# Patient Record
Sex: FEMALE | Race: WHITE | NOT HISPANIC OR LATINO | Employment: OTHER | URBAN - METROPOLITAN AREA
[De-identification: names, ages, dates, MRNs, and addresses within clinical notes are randomized per-mention and may not be internally consistent; named-entity substitution may affect disease eponyms.]

---

## 2017-04-28 ENCOUNTER — TRANSCRIBE ORDERS (OUTPATIENT)
Dept: MAMMOGRAPHY | Facility: HOSPITAL | Age: 77
End: 2017-04-28

## 2017-04-28 DIAGNOSIS — Z12.31 VISIT FOR SCREENING MAMMOGRAM: Primary | ICD-10-CM

## 2017-05-16 ENCOUNTER — APPOINTMENT (OUTPATIENT)
Dept: MAMMOGRAPHY | Facility: HOSPITAL | Age: 77
End: 2017-05-16
Attending: FAMILY MEDICINE

## 2017-05-17 ENCOUNTER — HOSPITAL ENCOUNTER (OUTPATIENT)
Dept: MAMMOGRAPHY | Facility: HOSPITAL | Age: 77
Discharge: HOME | End: 2017-05-17
Attending: FAMILY MEDICINE | Admitting: FAMILY MEDICINE

## 2017-05-17 DIAGNOSIS — Z12.31 VISIT FOR SCREENING MAMMOGRAM: ICD-10-CM

## 2017-05-17 PROCEDURE — 77063 BREAST TOMOSYNTHESIS BI: CPT

## 2017-05-17 PROCEDURE — 77063 BREAST TOMOSYNTHESIS BI: CPT | Performed by: RADIOLOGY

## 2017-05-17 PROCEDURE — G0202 SCR MAMMO BI INCL CAD: HCPCS | Performed by: RADIOLOGY

## 2017-05-17 PROCEDURE — G0202 SCR MAMMO BI INCL CAD: HCPCS

## 2018-03-12 ENCOUNTER — TRANSCRIBE ORDERS (OUTPATIENT)
Dept: ADMINISTRATIVE | Facility: HOSPITAL | Age: 78
End: 2018-03-12

## 2018-03-12 DIAGNOSIS — Z12.31 VISIT FOR SCREENING MAMMOGRAM: Primary | ICD-10-CM

## 2018-05-18 ENCOUNTER — HOSPITAL ENCOUNTER (OUTPATIENT)
Dept: MAMMOGRAPHY | Facility: HOSPITAL | Age: 78
Discharge: HOME | End: 2018-05-18
Attending: FAMILY MEDICINE | Admitting: FAMILY MEDICINE

## 2018-05-18 DIAGNOSIS — Z12.31 VISIT FOR SCREENING MAMMOGRAM: ICD-10-CM

## 2018-05-18 PROCEDURE — 77063 BREAST TOMOSYNTHESIS BI: CPT

## 2018-05-18 PROCEDURE — 77067 SCR MAMMO BI INCL CAD: CPT | Performed by: RADIOLOGY

## 2018-05-18 PROCEDURE — 77063 BREAST TOMOSYNTHESIS BI: CPT | Performed by: RADIOLOGY

## 2018-05-18 PROCEDURE — 77067 SCR MAMMO BI INCL CAD: CPT

## 2019-03-18 ENCOUNTER — TRANSCRIBE ORDERS (OUTPATIENT)
Dept: ADMINISTRATIVE | Facility: HOSPITAL | Age: 79
End: 2019-03-18

## 2019-03-18 DIAGNOSIS — Z12.31 VISIT FOR SCREENING MAMMOGRAM: Primary | ICD-10-CM

## 2019-05-22 ENCOUNTER — HOSPITAL ENCOUNTER (OUTPATIENT)
Dept: MAMMOGRAPHY | Facility: HOSPITAL | Age: 79
Discharge: HOME | End: 2019-05-22
Admitting: FAMILY MEDICINE

## 2019-05-22 DIAGNOSIS — Z12.31 VISIT FOR SCREENING MAMMOGRAM: ICD-10-CM

## 2019-05-22 PROCEDURE — 77067 SCR MAMMO BI INCL CAD: CPT

## 2019-05-22 PROCEDURE — 77063 BREAST TOMOSYNTHESIS BI: CPT | Performed by: RADIOLOGY

## 2019-05-22 PROCEDURE — 77063 BREAST TOMOSYNTHESIS BI: CPT

## 2019-05-22 PROCEDURE — 77067 SCR MAMMO BI INCL CAD: CPT | Performed by: RADIOLOGY

## 2020-05-14 ENCOUNTER — TRANSCRIBE ORDERS (OUTPATIENT)
Dept: ADMINISTRATIVE | Facility: HOSPITAL | Age: 80
End: 2020-05-14

## 2020-05-14 DIAGNOSIS — Z12.31 VISIT FOR SCREENING MAMMOGRAM: Primary | ICD-10-CM

## 2020-06-12 ENCOUNTER — HOSPITAL ENCOUNTER (EMERGENCY)
Facility: HOSPITAL | Age: 80
Discharge: HOME | End: 2020-06-12
Attending: EMERGENCY MEDICINE | Admitting: EMERGENCY MEDICINE

## 2020-06-12 ENCOUNTER — APPOINTMENT (OUTPATIENT)
Dept: GENERAL RADIOLOGY | Facility: HOSPITAL | Age: 80
End: 2020-06-12

## 2020-06-12 VITALS
TEMPERATURE: 99.3 F | DIASTOLIC BLOOD PRESSURE: 96 MMHG | WEIGHT: 150 LBS | BODY MASS INDEX: 24.11 KG/M2 | HEIGHT: 66 IN | HEART RATE: 74 BPM | OXYGEN SATURATION: 98 % | SYSTOLIC BLOOD PRESSURE: 128 MMHG | RESPIRATION RATE: 16 BRPM

## 2020-06-12 DIAGNOSIS — R07.89 CHEST WALL PAIN: ICD-10-CM
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
## 2020-06-12 DIAGNOSIS — S52.125A CLOSED NONDISPLACED FRACTURE OF HEAD OF LEFT RADIUS, INITIAL ENCOUNTER: Primary | ICD-10-CM

## 2020-06-12 PROCEDURE — 99283 EMERGENCY DEPT VISIT LOW MDM: CPT

## 2020-06-12 PROCEDURE — 73060 X-RAY EXAM OF HUMERUS: CPT

## 2020-06-12 PROCEDURE — 71111 X-RAY EXAM RIBS/CHEST4/> VWS: CPT

## 2020-06-12 PROCEDURE — 73090 X-RAY EXAM OF FOREARM: CPT

## 2020-06-12 RX ORDER — HYDROCODONE BITARTRATE AND ACETAMINOPHEN 7.5; 325 MG/1; MG/1
1 TABLET ORAL ONCE
Status: COMPLETED | OUTPATIENT
Start: 2020-06-12 | End: 2020-06-12

## 2020-06-12 RX ORDER — HYDROCODONE BITARTRATE AND ACETAMINOPHEN 5; 325 MG/1; MG/1
1-2 TABLET ORAL EVERY 4 HOURS PRN
Qty: 12 TABLET | Refills: 0 | Status: SHIPPED | OUTPATIENT
Start: 2020-06-12 | End: 2022-12-26 | Stop reason: HOSPADM

## 2020-06-12 RX ADMIN — HYDROCODONE BITARTRATE AND ACETAMINOPHEN 1 TABLET: 7.5; 325 TABLET ORAL at 17:20

## 2020-06-12 NOTE — ED PROVIDER NOTES
Subjective   Yarelis Barragan is a 80 y.o. female who presents to the ED with complaints of fall. She states that she was walking when she tripped on a seam in the sidewalk. The fall occurred at 1030 this morning. The patient tried to catch her fall by out-stretching her arms. She complains of pain throughout the middle portion of her left arm, left elbow region, and left rib cage. The pain in her left arm is described as a shooting pain. Her chest hurts when trying to sit up. Her  gave her some Naproxen for her pain at 1330. She has fractured her left arm before. She is able to move her fingers, with pain. Her former orthopedist has retired. She denies any neck pain. Her surgical history consists of a bilateral oophorectomy and hysterectomy. There are no other acute complaints at this time.      History provided by:  Medical records and patient  Fall   Mechanism of injury: fall    Injury location:  Shoulder/arm  Shoulder/arm injury location:  L arm  Incident location:  Outdoors  Time since incident:  8 hours  Arrived directly from scene: no    Fall:     Fall occurred:  Walking    Impact surface:  [place]  Prior to arrival data:     Loss of consciousness: no      Amnesic to event: no    Associated symptoms: chest pain (Left rib cage.)    Associated symptoms: no neck pain        Review of Systems   Cardiovascular: Positive for chest pain (Left rib cage.).   Musculoskeletal: Positive for myalgias (Left arm.). Negative for neck pain.   Neurological: Negative for syncope.   All other systems reviewed and are negative.      History reviewed. No pertinent past medical history.    No Known Allergies    Past Surgical History:   Procedure Laterality Date   • HYSTERECTOMY      AGE 52   • OOPHORECTOMY Bilateral     AGE 52       Family History   Problem Relation Age of Onset   • Breast cancer Paternal Grandmother 50   • Ovarian cancer Neg Hx        Social History     Socioeconomic History   • Marital status:       Spouse name: Not on file   • Number of children: Not on file   • Years of education: Not on file   • Highest education level: Not on file   Tobacco Use   • Smoking status: Never Smoker   Substance and Sexual Activity   • Alcohol use: Not Currently   • Drug use: Never   • Sexual activity: Defer         Objective   Physical Exam   Constitutional: She is oriented to person, place, and time. She appears well-developed and well-nourished.   HENT:   Head: Normocephalic and atraumatic.   Nose: Nose normal.   Eyes: Conjunctivae are normal. No scleral icterus.   Neck: Normal range of motion. Neck supple.   Cardiovascular: Normal rate, regular rhythm and normal heart sounds.   No murmur heard.  Pulses:       Radial pulses are 2+ on the right side, and 2+ on the left side.   2+ radial pulses.   Pulmonary/Chest: Effort normal and breath sounds normal. No respiratory distress. She exhibits tenderness. She exhibits no crepitus.   Left anterior inferior and lateral inferior chest wall tender to palpation, but without crepitus or contusion.  Clear lung sounds in all fields.   Abdominal: Soft. She exhibits no distension. There is no tenderness.   Abdomen non-tender.   Musculoskeletal: Normal range of motion. She exhibits no deformity.   Patient cannot extend or flex left elbow secondary to pain. Pronation an supination of left hand causes pain throughout the entire upper extremity. No shoulder involvement.   Neurological: She is alert and oriented to person, place, and time.   Skin: Skin is warm and dry.   No skin breaks.   Psychiatric: She has a normal mood and affect. Her behavior is normal.   Nursing note and vitals reviewed.      Procedures         ED Course  ED Course as of Jun 13 1241   Fri Jun 12, 2020   170 BARRIE query complete. Treatment plan to include limited course of prescribed controlled substance. Risks including addiction, benefits, and alternatives presented to patient.     #[id number]    []   1228 Radiographs of  "the left upper extremity demonstrate a small cortical irregularity of the radial head per my interpretation.    [MS]      ED Course User Index  [KP] MariaaRhea  [MS] Lamberto Rodriguez MD     No results found for this or any previous visit (from the past 24 hour(s)).  Note: In addition to lab results from this visit, the labs listed above may include labs taken at another facility or during a different encounter within the last 24 hours. Please correlate lab times with ED admission and discharge times for further clarification of the services performed during this visit.    XR Humerus Left   Preliminary Result   Nondisplaced radial head fracture. The remainder of the   humerus and forearm are grossly unremarkable. Degenerative changes seen   within the wrist.       DICTATED:   06/12/2020   EDITED/ls :   06/12/2020               XR Ribs Bilateral 4+ View With PA Chest   Preliminary Result   No acute cardiopulmonary disease, and no definite right or   left-sided rib fracture identified.       DICTATED:   06/12/2020   EDITED/ls :   06/12/2020               XR Forearm 2 View Left   Preliminary Result   Nondisplaced radial head fracture. The remainder of the   humerus and forearm are grossly unremarkable. Degenerative changes seen   within the wrist.       DICTATED:   06/12/2020   EDITED/ls :   06/12/2020                 Vitals:    06/12/20 1405 06/12/20 1416 06/12/20 1725   BP:  145/85 128/96   Pulse:  74    Resp: 20 16    Temp: 99.3 °F (37.4 °C)     SpO2:  96% 98%   Weight: 68 kg (150 lb)     Height: 167.6 cm (66\")       Medications   HYDROcodone-acetaminophen (NORCO) 7.5-325 MG per tablet 1 tablet (1 tablet Oral Given 6/12/20 1720)     ECG/EMG Results (last 24 hours)     ** No results found for the last 24 hours. **        No orders to display         COVID-19 RISK SCREEN     1. Has the patient had close contact without PPE with a lab confirmed COVID-19 (+) person or a person under investigation (PUI) for COVID-19 " infection?  -- No    2. Has the patient had respiratory symptoms, worsened/new cough and/or SOA, unexplained fever, or sudden loss of smell and/or taste in the past 7 days? --  No    3. Does the patient have baseline higher exposure risk such as working in healthcare field or currently residing in healthcare facility?  --  No                                            MDM    Final diagnoses:   Closed nondisplaced fracture of head of left radius, initial encounter   Chest wall pain   Fall, initial encounter       Documentation assistance provided by nuzhat Leroy.  Information recorded by the scribe was done at my direction and has been verified and validated by me.     Rhea Leroy  06/12/20 [id number]       Lamberto Rodriguez MD  06/13/20 [id number]

## 2020-06-12 NOTE — DISCHARGE INSTRUCTIONS
Take narcotics as prescribed. Be very careful as narcotics can give some people some imbalance. Take Miralax as needed for constipation, which can be narcotic-induced. Keep sling on throughout the day and at night, if this helps with the discomfort. Follow-up with Dr. Osiel Hood at next available appointment. Return to ED if worse.

## 2020-08-05 ENCOUNTER — HOSPITAL ENCOUNTER (OUTPATIENT)
Dept: MAMMOGRAPHY | Facility: HOSPITAL | Age: 80
Discharge: HOME | End: 2020-08-05
Admitting: FAMILY MEDICINE

## 2020-08-05 DIAGNOSIS — Z12.31 VISIT FOR SCREENING MAMMOGRAM: ICD-10-CM

## 2020-08-05 PROCEDURE — 77067 SCR MAMMO BI INCL CAD: CPT

## 2020-08-05 PROCEDURE — 77063 BREAST TOMOSYNTHESIS BI: CPT

## 2020-08-05 PROCEDURE — 77063 BREAST TOMOSYNTHESIS BI: CPT | Performed by: RADIOLOGY

## 2020-08-05 PROCEDURE — 77067 SCR MAMMO BI INCL CAD: CPT | Performed by: RADIOLOGY

## 2021-01-15 ENCOUNTER — IMMUNIZATION (OUTPATIENT)
Dept: VACCINE CLINIC | Facility: HOSPITAL | Age: 81
End: 2021-01-15

## 2021-01-15 PROCEDURE — 0002A: CPT | Performed by: FAMILY MEDICINE

## 2021-01-15 PROCEDURE — 0001A: CPT | Performed by: FAMILY MEDICINE

## 2021-01-15 PROCEDURE — 91300 HC SARSCOV02 VAC 30MCG/0.3ML IM: CPT | Performed by: FAMILY MEDICINE

## 2021-02-05 ENCOUNTER — IMMUNIZATION (OUTPATIENT)
Dept: VACCINE CLINIC | Facility: HOSPITAL | Age: 81
End: 2021-02-05

## 2021-02-05 PROCEDURE — 0002A: CPT | Performed by: INTERNAL MEDICINE

## 2021-02-05 PROCEDURE — 91300 HC SARSCOV02 VAC 30MCG/0.3ML IM: CPT | Performed by: INTERNAL MEDICINE

## 2021-09-13 ENCOUNTER — TRANSCRIBE ORDERS (OUTPATIENT)
Dept: ADMINISTRATIVE | Facility: HOSPITAL | Age: 81
End: 2021-09-13

## 2021-09-13 DIAGNOSIS — Z12.31 VISIT FOR SCREENING MAMMOGRAM: Primary | ICD-10-CM

## 2021-10-08 ENCOUNTER — HOSPITAL ENCOUNTER (OUTPATIENT)
Dept: MAMMOGRAPHY | Facility: HOSPITAL | Age: 81
Discharge: HOME | End: 2021-10-08
Admitting: FAMILY MEDICINE

## 2021-10-08 DIAGNOSIS — Z12.31 VISIT FOR SCREENING MAMMOGRAM: ICD-10-CM

## 2021-10-08 PROCEDURE — 77063 BREAST TOMOSYNTHESIS BI: CPT

## 2021-10-08 PROCEDURE — 77067 SCR MAMMO BI INCL CAD: CPT | Performed by: RADIOLOGY

## 2021-10-08 PROCEDURE — 77063 BREAST TOMOSYNTHESIS BI: CPT | Performed by: RADIOLOGY

## 2021-10-08 PROCEDURE — 77067 SCR MAMMO BI INCL CAD: CPT

## 2022-08-29 ENCOUNTER — TRANSCRIBE ORDERS (OUTPATIENT)
Dept: ADMINISTRATIVE | Facility: HOSPITAL | Age: 82
End: 2022-08-29

## 2022-08-29 DIAGNOSIS — Z12.31 VISIT FOR SCREENING MAMMOGRAM: Primary | ICD-10-CM

## 2022-11-28 ENCOUNTER — HOSPITAL ENCOUNTER (OUTPATIENT)
Dept: MAMMOGRAPHY | Facility: HOSPITAL | Age: 82
Discharge: HOME | End: 2022-11-28
Admitting: FAMILY MEDICINE

## 2022-11-28 DIAGNOSIS — Z12.31 VISIT FOR SCREENING MAMMOGRAM: ICD-10-CM

## 2022-11-28 PROCEDURE — 77067 SCR MAMMO BI INCL CAD: CPT | Performed by: RADIOLOGY

## 2022-11-28 PROCEDURE — 77063 BREAST TOMOSYNTHESIS BI: CPT | Performed by: RADIOLOGY

## 2022-11-28 PROCEDURE — 77063 BREAST TOMOSYNTHESIS BI: CPT

## 2022-11-28 PROCEDURE — 77067 SCR MAMMO BI INCL CAD: CPT

## 2022-12-22 ENCOUNTER — HOSPITAL ENCOUNTER (INPATIENT)
Facility: HOSPITAL | Age: 82
LOS: 4 days | Discharge: HOME | DRG: 179 | End: 2022-12-26
Attending: EMERGENCY MEDICINE | Admitting: INTERNAL MEDICINE
Payer: MEDICARE

## 2022-12-22 ENCOUNTER — APPOINTMENT (OUTPATIENT)
Dept: CT IMAGING | Facility: HOSPITAL | Age: 82
DRG: 179 | End: 2022-12-22
Payer: MEDICARE

## 2022-12-22 DIAGNOSIS — R09.02 HYPOXIA: ICD-10-CM

## 2022-12-22 DIAGNOSIS — J02.9 SORE THROAT: ICD-10-CM

## 2022-12-22 DIAGNOSIS — U07.1 COVID-19: Primary | ICD-10-CM

## 2022-12-22 PROBLEM — R53.83 FATIGUE: Status: ACTIVE | Noted: 2022-12-22

## 2022-12-22 PROBLEM — J40 BRONCHITIS: Status: ACTIVE | Noted: 2022-12-22

## 2022-12-22 LAB
ALBUMIN SERPL-MCNC: 4 G/DL (ref 3.5–5.2)
ALBUMIN/GLOB SERPL: 1.8 G/DL
ALP SERPL-CCNC: 70 U/L (ref 39–117)
ALT SERPL W P-5'-P-CCNC: 15 U/L (ref 1–33)
ANION GAP SERPL CALCULATED.3IONS-SCNC: 12 MMOL/L (ref 5–15)
AST SERPL-CCNC: 21 U/L (ref 1–32)
BASOPHILS # BLD AUTO: 0.02 10*3/MM3 (ref 0–0.2)
BASOPHILS NFR BLD AUTO: 0.4 % (ref 0–1.5)
BILIRUB SERPL-MCNC: 0.4 MG/DL (ref 0–1.2)
BILIRUB UR QL STRIP: NEGATIVE
BUN SERPL-MCNC: 14 MG/DL (ref 8–23)
BUN/CREAT SERPL: 18.2 (ref 7–25)
CALCIUM SPEC-SCNC: 8.9 MG/DL (ref 8.6–10.5)
CHLORIDE SERPL-SCNC: 109 MMOL/L (ref 98–107)
CLARITY UR: CLEAR
CO2 SERPL-SCNC: 20 MMOL/L (ref 22–29)
COLOR UR: YELLOW
CREAT SERPL-MCNC: 0.77 MG/DL (ref 0.57–1)
CRP SERPL-MCNC: <0.3 MG/DL (ref 0–0.5)
D-LACTATE SERPL-SCNC: 0.8 MMOL/L (ref 0.5–2)
DEPRECATED RDW RBC AUTO: 40.5 FL (ref 37–54)
EGFRCR SERPLBLD CKD-EPI 2021: 77.6 ML/MIN/1.73
EOSINOPHIL # BLD AUTO: 0.05 10*3/MM3 (ref 0–0.4)
EOSINOPHIL NFR BLD AUTO: 1 % (ref 0.3–6.2)
ERYTHROCYTE [DISTWIDTH] IN BLOOD BY AUTOMATED COUNT: 12.2 % (ref 12.3–15.4)
ERYTHROCYTE [SEDIMENTATION RATE] IN BLOOD: 14 MM/HR (ref 0–30)
FLUAV SUBTYP SPEC NAA+PROBE: NOT DETECTED
FLUBV RNA ISLT QL NAA+PROBE: NOT DETECTED
GLOBULIN UR ELPH-MCNC: 2.2 GM/DL
GLUCOSE SERPL-MCNC: 104 MG/DL (ref 65–99)
GLUCOSE UR STRIP-MCNC: NEGATIVE MG/DL
HCT VFR BLD AUTO: 40.1 % (ref 34–46.6)
HGB BLD-MCNC: 13.5 G/DL (ref 12–15.9)
HGB UR QL STRIP.AUTO: NEGATIVE
HOLD SPECIMEN: NORMAL
IMM GRANULOCYTES # BLD AUTO: 0.01 10*3/MM3 (ref 0–0.05)
IMM GRANULOCYTES NFR BLD AUTO: 0.2 % (ref 0–0.5)
KETONES UR QL STRIP: NEGATIVE
LEUKOCYTE ESTERASE UR QL STRIP.AUTO: NEGATIVE
LYMPHOCYTES # BLD AUTO: 1.07 10*3/MM3 (ref 0.7–3.1)
LYMPHOCYTES NFR BLD AUTO: 21.6 % (ref 19.6–45.3)
MAGNESIUM SERPL-MCNC: 1.8 MG/DL (ref 1.6–2.4)
MCH RBC QN AUTO: 30.6 PG (ref 26.6–33)
MCHC RBC AUTO-ENTMCNC: 33.7 G/DL (ref 31.5–35.7)
MCV RBC AUTO: 90.9 FL (ref 79–97)
MONOCYTES # BLD AUTO: 0.72 10*3/MM3 (ref 0.1–0.9)
MONOCYTES NFR BLD AUTO: 14.5 % (ref 5–12)
NEUTROPHILS NFR BLD AUTO: 3.08 10*3/MM3 (ref 1.7–7)
NEUTROPHILS NFR BLD AUTO: 62.3 % (ref 42.7–76)
NITRITE UR QL STRIP: NEGATIVE
NRBC BLD AUTO-RTO: 0 /100 WBC (ref 0–0.2)
NT-PROBNP SERPL-MCNC: 166.1 PG/ML (ref 0–1800)
PH UR STRIP.AUTO: 5.5 [PH] (ref 5–8)
PLATELET # BLD AUTO: 191 10*3/MM3 (ref 140–450)
PMV BLD AUTO: 9.5 FL (ref 6–12)
POTASSIUM SERPL-SCNC: 4.2 MMOL/L (ref 3.5–5.2)
PROCALCITONIN SERPL-MCNC: 0.06 NG/ML (ref 0–0.25)
PROT SERPL-MCNC: 6.2 G/DL (ref 6–8.5)
PROT UR QL STRIP: NEGATIVE
QT INTERVAL: 366 MS
QTC INTERVAL: 445 MS
RBC # BLD AUTO: 4.41 10*6/MM3 (ref 3.77–5.28)
S PYO AG THROAT QL: NEGATIVE
SARS-COV-2 RNA PNL SPEC NAA+PROBE: DETECTED
SODIUM SERPL-SCNC: 141 MMOL/L (ref 136–145)
SP GR UR STRIP: 1.02 (ref 1–1.03)
TROPONIN T SERPL-MCNC: <0.01 NG/ML (ref 0–0.03)
UROBILINOGEN UR QL STRIP: NORMAL
WBC NRBC COR # BLD: 4.95 10*3/MM3 (ref 3.4–10.8)
WHOLE BLOOD HOLD COAG: NORMAL

## 2022-12-22 PROCEDURE — 87081 CULTURE SCREEN ONLY: CPT

## 2022-12-22 PROCEDURE — 25010000002 ONDANSETRON PER 1 MG: Performed by: EMERGENCY MEDICINE

## 2022-12-22 PROCEDURE — 80053 COMPREHEN METABOLIC PANEL: CPT | Performed by: NURSE PRACTITIONER

## 2022-12-22 PROCEDURE — 84145 PROCALCITONIN (PCT): CPT | Performed by: NURSE PRACTITIONER

## 2022-12-22 PROCEDURE — 83605 ASSAY OF LACTIC ACID: CPT | Performed by: NURSE PRACTITIONER

## 2022-12-22 PROCEDURE — 25010000002 FENTANYL CITRATE (PF) 50 MCG/ML SOLUTION: Performed by: EMERGENCY MEDICINE

## 2022-12-22 PROCEDURE — 86140 C-REACTIVE PROTEIN: CPT | Performed by: INTERNAL MEDICINE

## 2022-12-22 PROCEDURE — 71275 CT ANGIOGRAPHY CHEST: CPT

## 2022-12-22 PROCEDURE — 70491 CT SOFT TISSUE NECK W/DYE: CPT

## 2022-12-22 PROCEDURE — 99223 1ST HOSP IP/OBS HIGH 75: CPT | Performed by: INTERNAL MEDICINE

## 2022-12-22 PROCEDURE — 25010000002 REMDESIVIR 100 MG/20ML SOLUTION 1 EACH VIAL: Performed by: INTERNAL MEDICINE

## 2022-12-22 PROCEDURE — 85025 COMPLETE CBC W/AUTO DIFF WBC: CPT | Performed by: NURSE PRACTITIONER

## 2022-12-22 PROCEDURE — 84484 ASSAY OF TROPONIN QUANT: CPT | Performed by: NURSE PRACTITIONER

## 2022-12-22 PROCEDURE — 83880 ASSAY OF NATRIURETIC PEPTIDE: CPT | Performed by: NURSE PRACTITIONER

## 2022-12-22 PROCEDURE — 87880 STREP A ASSAY W/OPTIC: CPT

## 2022-12-22 PROCEDURE — XW033E5 INTRODUCTION OF REMDESIVIR ANTI-INFECTIVE INTO PERIPHERAL VEIN, PERCUTANEOUS APPROACH, NEW TECHNOLOGY GROUP 5: ICD-10-PCS | Performed by: INTERNAL MEDICINE

## 2022-12-22 PROCEDURE — 99285 EMERGENCY DEPT VISIT HI MDM: CPT

## 2022-12-22 PROCEDURE — 25010000002 ENOXAPARIN PER 10 MG: Performed by: INTERNAL MEDICINE

## 2022-12-22 PROCEDURE — 85652 RBC SED RATE AUTOMATED: CPT | Performed by: INTERNAL MEDICINE

## 2022-12-22 PROCEDURE — 93005 ELECTROCARDIOGRAM TRACING: CPT | Performed by: NURSE PRACTITIONER

## 2022-12-22 PROCEDURE — 0 IOPAMIDOL PER 1 ML: Performed by: EMERGENCY MEDICINE

## 2022-12-22 PROCEDURE — 87636 SARSCOV2 & INF A&B AMP PRB: CPT

## 2022-12-22 PROCEDURE — 81003 URINALYSIS AUTO W/O SCOPE: CPT | Performed by: NURSE PRACTITIONER

## 2022-12-22 PROCEDURE — 36415 COLL VENOUS BLD VENIPUNCTURE: CPT

## 2022-12-22 PROCEDURE — 25010000002 MAGNESIUM SULFATE 2 GM/50ML SOLUTION: Performed by: INTERNAL MEDICINE

## 2022-12-22 PROCEDURE — 87040 BLOOD CULTURE FOR BACTERIA: CPT | Performed by: NURSE PRACTITIONER

## 2022-12-22 PROCEDURE — 3E0333Z INTRODUCTION OF ANTI-INFLAMMATORY INTO PERIPHERAL VEIN, PERCUTANEOUS APPROACH: ICD-10-PCS | Performed by: INTERNAL MEDICINE

## 2022-12-22 PROCEDURE — 25010000002 DEXAMETHASONE PER 1 MG: Performed by: INTERNAL MEDICINE

## 2022-12-22 PROCEDURE — 70450 CT HEAD/BRAIN W/O DYE: CPT

## 2022-12-22 PROCEDURE — 83735 ASSAY OF MAGNESIUM: CPT | Performed by: NURSE PRACTITIONER

## 2022-12-22 RX ORDER — BISACODYL 10 MG
10 SUPPOSITORY, RECTAL RECTAL DAILY PRN
Status: DISCONTINUED | OUTPATIENT
Start: 2022-12-22 | End: 2022-12-26 | Stop reason: HOSPADM

## 2022-12-22 RX ORDER — POLYETHYLENE GLYCOL 3350 17 G/17G
17 POWDER, FOR SOLUTION ORAL DAILY PRN
Status: DISCONTINUED | OUTPATIENT
Start: 2022-12-22 | End: 2022-12-26 | Stop reason: HOSPADM

## 2022-12-22 RX ORDER — SODIUM CHLORIDE 0.9 % (FLUSH) 0.9 %
10 SYRINGE (ML) INJECTION AS NEEDED
Status: DISCONTINUED | OUTPATIENT
Start: 2022-12-22 | End: 2022-12-26 | Stop reason: HOSPADM

## 2022-12-22 RX ORDER — ONDANSETRON 2 MG/ML
4 INJECTION INTRAMUSCULAR; INTRAVENOUS ONCE
Status: COMPLETED | OUTPATIENT
Start: 2022-12-22 | End: 2022-12-22

## 2022-12-22 RX ORDER — MAGNESIUM SULFATE HEPTAHYDRATE 40 MG/ML
2 INJECTION, SOLUTION INTRAVENOUS AS NEEDED
Status: DISCONTINUED | OUTPATIENT
Start: 2022-12-22 | End: 2022-12-26 | Stop reason: HOSPADM

## 2022-12-22 RX ORDER — MAGNESIUM SULFATE 1 G/100ML
1 INJECTION INTRAVENOUS AS NEEDED
Status: DISCONTINUED | OUTPATIENT
Start: 2022-12-22 | End: 2022-12-26 | Stop reason: HOSPADM

## 2022-12-22 RX ORDER — MAGNESIUM SULFATE HEPTAHYDRATE 40 MG/ML
4 INJECTION, SOLUTION INTRAVENOUS AS NEEDED
Status: DISCONTINUED | OUTPATIENT
Start: 2022-12-22 | End: 2022-12-26 | Stop reason: HOSPADM

## 2022-12-22 RX ORDER — ONDANSETRON 2 MG/ML
4 INJECTION INTRAMUSCULAR; INTRAVENOUS EVERY 6 HOURS PRN
Status: DISCONTINUED | OUTPATIENT
Start: 2022-12-22 | End: 2022-12-26 | Stop reason: HOSPADM

## 2022-12-22 RX ORDER — ALBUTEROL SULFATE 90 UG/1
2 AEROSOL, METERED RESPIRATORY (INHALATION)
Status: DISCONTINUED | OUTPATIENT
Start: 2022-12-22 | End: 2022-12-22

## 2022-12-22 RX ORDER — ACETAMINOPHEN 160 MG/5ML
650 SOLUTION ORAL EVERY 6 HOURS PRN
Status: DISCONTINUED | OUTPATIENT
Start: 2022-12-22 | End: 2022-12-26 | Stop reason: HOSPADM

## 2022-12-22 RX ORDER — ACETAMINOPHEN 325 MG/1
650 TABLET ORAL EVERY 6 HOURS PRN
Status: DISCONTINUED | OUTPATIENT
Start: 2022-12-22 | End: 2022-12-22

## 2022-12-22 RX ORDER — AMOXICILLIN 250 MG
2 CAPSULE ORAL 2 TIMES DAILY
Status: DISCONTINUED | OUTPATIENT
Start: 2022-12-22 | End: 2022-12-23

## 2022-12-22 RX ORDER — DEXAMETHASONE SODIUM PHOSPHATE 4 MG/ML
6 INJECTION, SOLUTION INTRA-ARTICULAR; INTRALESIONAL; INTRAMUSCULAR; INTRAVENOUS; SOFT TISSUE DAILY
Status: DISCONTINUED | OUTPATIENT
Start: 2022-12-22 | End: 2022-12-26 | Stop reason: HOSPADM

## 2022-12-22 RX ORDER — DEXAMETHASONE SODIUM PHOSPHATE 4 MG/ML
6 INJECTION, SOLUTION INTRA-ARTICULAR; INTRALESIONAL; INTRAMUSCULAR; INTRAVENOUS; SOFT TISSUE DAILY
Status: DISCONTINUED | OUTPATIENT
Start: 2022-12-22 | End: 2022-12-22

## 2022-12-22 RX ORDER — FENTANYL CITRATE 50 UG/ML
25 INJECTION, SOLUTION INTRAMUSCULAR; INTRAVENOUS ONCE
Status: COMPLETED | OUTPATIENT
Start: 2022-12-22 | End: 2022-12-22

## 2022-12-22 RX ORDER — BISACODYL 5 MG/1
5 TABLET, DELAYED RELEASE ORAL DAILY PRN
Status: DISCONTINUED | OUTPATIENT
Start: 2022-12-22 | End: 2022-12-26 | Stop reason: HOSPADM

## 2022-12-22 RX ORDER — SODIUM CHLORIDE 9 MG/ML
40 INJECTION, SOLUTION INTRAVENOUS AS NEEDED
Status: DISCONTINUED | OUTPATIENT
Start: 2022-12-22 | End: 2022-12-26 | Stop reason: HOSPADM

## 2022-12-22 RX ORDER — GUAIFENESIN/DEXTROMETHORPHAN 100-10MG/5
5 SYRUP ORAL EVERY 4 HOURS PRN
Status: DISCONTINUED | OUTPATIENT
Start: 2022-12-22 | End: 2022-12-26 | Stop reason: HOSPADM

## 2022-12-22 RX ORDER — SODIUM CHLORIDE 0.9 % (FLUSH) 0.9 %
10 SYRINGE (ML) INJECTION EVERY 12 HOURS SCHEDULED
Status: DISCONTINUED | OUTPATIENT
Start: 2022-12-22 | End: 2022-12-26 | Stop reason: HOSPADM

## 2022-12-22 RX ORDER — ALBUTEROL SULFATE 90 UG/1
2 AEROSOL, METERED RESPIRATORY (INHALATION) EVERY 4 HOURS PRN
Status: DISCONTINUED | OUTPATIENT
Start: 2022-12-22 | End: 2022-12-26 | Stop reason: HOSPADM

## 2022-12-22 RX ORDER — ENOXAPARIN SODIUM 100 MG/ML
40 INJECTION SUBCUTANEOUS NIGHTLY
Status: DISCONTINUED | OUTPATIENT
Start: 2022-12-22 | End: 2022-12-26 | Stop reason: HOSPADM

## 2022-12-22 RX ORDER — TRAMADOL HYDROCHLORIDE 50 MG/1
25 TABLET ORAL EVERY 6 HOURS PRN
Status: DISCONTINUED | OUTPATIENT
Start: 2022-12-22 | End: 2022-12-26 | Stop reason: HOSPADM

## 2022-12-22 RX ADMIN — SENNOSIDES AND DOCUSATE SODIUM 2 TABLET: 50; 8.6 TABLET ORAL at 21:37

## 2022-12-22 RX ADMIN — DEXAMETHASONE SODIUM PHOSPHATE 6 MG: 4 INJECTION INTRA-ARTICULAR; INTRALESIONAL; INTRAMUSCULAR; INTRAVENOUS; SOFT TISSUE at 21:37

## 2022-12-22 RX ADMIN — MAGNESIUM SULFATE HEPTAHYDRATE 2 G: 2 INJECTION, SOLUTION INTRAVENOUS at 18:22

## 2022-12-22 RX ADMIN — ACETAMINOPHEN 650 MG: 160 SOLUTION ORAL at 18:26

## 2022-12-22 RX ADMIN — REMDESIVIR 200 MG: 100 INJECTION, POWDER, LYOPHILIZED, FOR SOLUTION INTRAVENOUS at 21:53

## 2022-12-22 RX ADMIN — FENTANYL CITRATE 25 MCG: 50 INJECTION, SOLUTION INTRAMUSCULAR; INTRAVENOUS at 13:45

## 2022-12-22 RX ADMIN — IOPAMIDOL 100 ML: 755 INJECTION, SOLUTION INTRAVENOUS at 11:14

## 2022-12-22 RX ADMIN — ENOXAPARIN SODIUM 40 MG: 40 INJECTION SUBCUTANEOUS at 21:36

## 2022-12-22 RX ADMIN — TRAMADOL HYDROCHLORIDE 25 MG: 50 TABLET, COATED ORAL at 21:49

## 2022-12-22 RX ADMIN — Medication 10 ML: at 21:53

## 2022-12-22 RX ADMIN — GUAIFENESIN AND DEXTROMETHORPHAN 5 ML: 100; 10 SYRUP ORAL at 18:26

## 2022-12-22 RX ADMIN — ONDANSETRON 4 MG: 2 INJECTION INTRAMUSCULAR; INTRAVENOUS at 13:45

## 2022-12-22 NOTE — H&P
[facility]  HISTORY AND PHYSICAL    Patient Name: Yarelis Barragan  : 1940  MRN: [id number]  Primary Care Physician: Carlos Shah MD  Date of admission: 2022      Subjective   Subjective     Chief Complaint:  Cough, sore throat, dyspnea, fatigue    HPI:  Yarelis Barragan is a 82 y.o. female who is healthy on no chronic medications and lives at home w/ her . Patient developed sore throat, dry cough, mild dyspnea and fatigue on 22. Tested + for covid on home kit on 22. Due to progressive symptoms presented to [facility] where sats 90-91%. covid 19 pcr positive (negative influenza), normal wbc count, esr normal, crp normal, procal normal, u/a normal. Ct angio chest negative for PE, no ground glass changes, perhaps some atelectasis at bases. Ct neck soft tissue scan was negative. Due to severe sore throat, ongoing severe fatigue, and some relative hypoxic was admitted to hospitalist service.       Review of Systems   No headache currently  No LE edema  No chest pain  No abd pain  No rash  Mild nausea, no emesis, no diarrhea  All other systems reviewed and are negative.     Personal History     History reviewed. No pertinent past medical history.          Past Surgical History:   Procedure Laterality Date   • HYSTERECTOMY      AGE 52   • OOPHORECTOMY Bilateral     AGE 52       Family History:  family history includes Breast cancer (age of onset: 50) in her paternal grandmother. Otherwise pertinent FHx was reviewed and unremarkable    Social History:  reports that she has never smoked. She does not have any smokeless tobacco history on file. She reports that she does not currently use alcohol. She reports that she does not use drugs.  Social History     Social History Narrative   • Not on file       Medications:  Available home medication information reviewed.  (Not in a hospital admission)      No Known Allergies    Objective   Objective      Vital Signs:   Temp:  [97.9 °F (36.6 °C)] 97.9 °F (36.6 °C)  Heart Rate:  [80-95] 93  Resp:  [18] 18  BP: (113-150)/() 113/66  Flow (L/min):  [2] 2       Physical Exam   Constitutional:Alert, oriented x 3, elderly, ill but nontoxic appearing with normal work of breathing at rest  Psych:Normal/appropriate affect   HEENT:NCAT, oropharynx clear  Neck: neck supple, full range of motion, no mass noted  Neuro: Face symmetric, speech clear, equal , moves all extremities  Cardiac: RRR; No pretibial pitting edema  Resp: very faint scattered rhonchi bilaterally, normal respiratory effort  GI: abd soft, nontender to palpation  Skin: No extremity rash  Musculoskeletal/extremities: no cyanosis of extremities; no significant ankle edema        Result Review:  I have personally reviewed the results from the time of this admission to 12/22/2022 15:47 EST and agree with these findings:  [x]  Laboratory list / accordion  []  Microbiology  [x]  Radiology  [x]  EKG/Telemetry   []  Cardiology/Vascular   []  Pathology  []  Old records  []  Other:  Most notable findings include: see hpi and below      LAB RESULTS:      Lab 12/22/22  0807 12/22/22  0747 12/22/22  0732   WBC  --   --  4.95   HEMOGLOBIN  --   --  13.5   HEMATOCRIT  --   --  40.1   PLATELETS  --   --  191   NEUTROS ABS  --   --  3.08   IMMATURE GRANS (ABS)  --   --  0.01   LYMPHS ABS  --   --  1.07   MONOS ABS  --   --  0.72   EOS ABS  --   --  0.05   MCV  --   --  90.9   SED RATE  --   --  14   CRP <0.30  --   --    PROCALCITONIN  --  0.06  --    LACTATE  --   --  0.8         Lab 12/22/22  0747   SODIUM 141   POTASSIUM 4.2   CHLORIDE 109*   CO2 20.0*   ANION GAP 12.0   BUN 14   CREATININE 0.77   EGFR 77.6   GLUCOSE 104*   CALCIUM 8.9   MAGNESIUM 1.8         Lab 12/22/22  0747   TOTAL PROTEIN 6.2   ALBUMIN 4.00   GLOBULIN 2.2   ALT (SGPT) 15   AST (SGOT) 21   BILIRUBIN 0.4   ALK PHOS 70         Lab 12/22/22  0807 12/22/22  0747   PROBNP  --  166.1   TROPONIN T  <0.010  --                  UA    Urinalysis 12/22/22   Specific [place], UA 1.016   Ketones, UA Negative   Blood, UA Negative   Leukocytes, UA Negative   Nitrite, UA Negative             Microbiology Results (last 10 days)     Procedure Component Value - Date/Time    COVID PRE-OP / PRE-PROCEDURE SCREENING ORDER (NO ISOLATION) - Swab, Nasopharynx [[id number]]  (Abnormal) Collected: 12/22/22 0714    Lab Status: Final result Specimen: Swab from Nasopharynx Updated: 12/22/22 0759    Narrative:      The following orders were created for panel order COVID PRE-OP / PRE-PROCEDURE SCREENING ORDER (NO ISOLATION) - Swab, Nasopharynx.  Procedure                               Abnormality         Status                     ---------                               -----------         ------                     COVID-19 and FLU A/B PCR ...[[id number]]  Abnormal            Final result                 Please view results for these tests on the individual orders.    Rapid Strep A Screen - Swab, Throat [[id number]]  (Normal) Collected: 12/22/22 0714    Lab Status: Final result Specimen: Swab from Throat Updated: 12/22/22 0739     Strep A Ag Negative    Narrative:      Test performed by Direct Antigen Testing.    COVID-19 and FLU A/B PCR - Swab, Nasopharynx [[id number]]  (Abnormal) Collected: 12/22/22 0714    Lab Status: Final result Specimen: Swab from Nasopharynx Updated: 12/22/22 0759     COVID19 Detected     Influenza A PCR Not Detected     Influenza B PCR Not Detected    Narrative:      Fact sheet for providers: https://www.fda.gov/media/167874/download    Fact sheet for patients: https://www.fda.gov/media/065165/download    Test performed by PCR.  Influenza A and Influenza B negative results should be considered presumptive in samples that have a positive SARS-CoV-2 result.    Competitive inhibition studies showed that SARS-CoV-2 virus, when present at concentrations above 3.6E+04 copies/mL, can inhibit the detection and amplification of  influenza A and influenza B virus RNA if present at or below 1.8E+02 copies/mL or 4.9E+02 copies/mL, respectively, and may lead to false negative influenza virus results. If co-infection with influenza A or influenza B virus is suspected in samples with a positive SARS-CoV-2 result, the sample should be re-tested with another FDA cleared, approved, or authorized influenza test, if influenza virus detection would change clinical management.          CT Head Without Contrast    Result Date: 12/22/2022  CT HEAD WO CONTRAST-  Date of Exam: 12/22/2022 2:03 PM  Indication: abnormal findings on CT today; subdural hematoma.  Comparison: 12/22/2022 CT soft tissue neck earlier same day  Technique:  Without contrast, contiguous axial CT images of the head were obtained from skull base to vertex.  Coronal and sagittal reconstructions were performed.  Automated exposure control and iterative reconstruction methods were used.  FINDINGS PET/CT demonstrate symmetric widening of the anterior CSF spaces bilaterally. This is likely due to atrophy rather than a chronic subdural hematoma or hygroma. The previous study of the soft tissue neck was performed with the head somewhat angled in the gantry, which cause this to look asymmetric. There is mild periventricular white matter hypodensity most commonly secondary to chronic small vessel ischemic change. Medial basal ganglia calcifications are noted. There is no evidence of mass effect, midline shift. Evaluation for hemorrhage is limited due to persistent contrast opacification within the vasculature from earlier prior study. No pathologic parenchymal enhancement is identified. There is normal gray-white differentiation. Atherosclerotic vascular calcification is noted. Orbits are unremarkable for age. Visualized paranasal sinuses and mastoid air cells are clear. No acute calvarial defects are seen.      Impression:  1. Frontal parenchymal volume loss resulting in widening of the anterior  CSF spaces bilaterally. Earlier soft tissue neck CT was performed with the head angled in the gantry which gave an asymmetric appearance to the anterior CSF spaces. No evidence of subdural hematoma or hygroma on the current study. 2. Parenchymal volume loss and probable chronic small vessel ischemic change. Brain MRI is more sensitive to evaluate for acute or subacute infarcts and to evaluate for intracranial metastatic disease.  This report was finalized on 12/22/2022 2:22 PM by Osiel Carson MD.      CT Soft Tissue Neck With Contrast    Result Date: 12/22/2022  CT SOFT TISSUE NECK W CONTRAST-  Date of Exam: 12/22/2022 10:58 AM  Indication: foreign body sensation in painful throat  Comparison: None available.  Technique: Contiguous axial imaging of the neck was performed after uneventful administration of 100 cc Isovue-370 .  Coronal and sagittal reconstructions were performed.  Automated exposure control and iterative reconstruction methods were used.  FINDINGS: There is a small fluid density extra-axial collection at the left frontal region measuring 7 mm. This is suggestive of a chronic subdural hematoma or hygroma. Visualized basilar portions of brain and skull base are otherwise grossly unremarkable. There is no pathologic enhancement. Visualized paranasal sinuses and mastoid air cells are clear. Visualized orbits are unremarkable for age. Parotid and submandibular glands appears symmetric. There is a 9 mm indeterminate hypodense lesion in the inferior right thyroid lobe. No cervical adenopathy is identified. Parapharyngeal soft tissues are unremarkable. The epiglottis and periepiglottic tissues appear symmetric and unremarkable. No abnormal fluid collections or inflammatory changes.  Atherosclerotic vascular calcification is present. There is degenerative change in cervical spine. No aggressive osseous lesions are identified.      Impression:  1. No acute soft tissue abnormality identified in the neck. 2. 7 mm  fluid density extra-axial collection partially included over the left frontal region suggesting a chronic subdural hematoma or hygroma. 3. 9 mm indeterminate right thyroid nodule.    This report was finalized on 12/22/2022 12:59 PM by Osiel Carson MD.      CT Angiogram Chest    Result Date: 12/22/2022  CT ANGIOGRAM CHEST-  Date of Exam: 12/22/2022 10:58 AM  Indication: dyspnea; covid.  Comparison: None available.  Technique: Contiguous axial CTA imaging of the chest was obtained following the uneventful intravenous administration of 100 cc Isovue-370 contrast. Reconstructions in the coronal and sagittal planes were also performed. 3-D reconstructed images were also created and reviewed. Automated exposure control and iterative reconstruction methods were used.  FINDINGS: There are no pulmonary arterial filling defects evident to suggest PE. Pulmonary arteries appear grossly normal caliber. Thoracic aorta is normal caliber. There is mild scarring in the lung apices. There is patchy groundglass opacity in the lung bases bilaterally which may be due to atypical infiltrate or dependent atelectasis. No pleural fluid is seen. No pulmonary nodules or masses are identified. Central airways are grossly patent. The stomach or hilar adenopathy is identified. No axillary or supraclavicular adenopathy is identified. Limited imaging through the upper abdomen demonstrates a grossly normal adrenal morphology. There is a small hiatal hernia. Small amount of pericardial fluid is noted. There are mild degenerative changes in the spine. No aggressive osseous lesions are identified.      Impression:  1. No evidence of pulmonary embolus. 2. Nonspecific groundglass opacities in the bases may be due to early atypical infiltrate or dependent atelectasis.  This report was finalized on 12/22/2022 12:45 PM by Osiel Carson MD.            Assessment & Plan   Assessment & Plan     Active Hospital Problems    Diagnosis  POA   • **COVID-19 [U07.1]   Yes   • Hypoxia [R09.02]  Unknown     Priority: High   • Bronchitis [J40]  Unknown   • Sore throat [J02.9]  Unknown   • Fatigue [R53.83]  Unknown       *acute covid 19 w/ bronchitis  *relative hypoxia  *sore throat  *fatigue  -first symptoms 12/20/22, tested + on 12/21/22  -resting room air sats consistently 90-91% in room   -ct angio chest negative for PE or overt ggo  -ct neck: normal (except indicentl 3.9mm right thyroid nodule); follow up outpatient for this  -remdesivir & decadron intiated  -prn tylenol, robitussin-dm, albuterol, chloraseptic spray to throat, low dose ultram prn  -pt/ot eval in a.m.    Am labs: cbc,cmp,crp      DVT prophylaxis:  Sq lovenox      CODE STATUS:    Code Status and Medical Interventions:   Ordered at: 12/22/22 1537     Medical Intervention Limits:    NO intubation (DNI)     Code Status (Patient has no pulse and is not breathing):    No CPR (Do Not Attempt to Resuscitate)     Medical Interventions (Patient has pulse or is breathing):    Limited Support       Expected Discharge ? 12/24/22 depending on clinical course       Chino Bautista MD  12/22/22

## 2022-12-22 NOTE — Clinical Note
Level of Care: Telemetry [5]   Diagnosis: COVID-19 [[id number]]   Admitting Physician: MIRI POWERS [[id number]]   Attending Physician: MIRI POWERS [[id number]]

## 2022-12-22 NOTE — ED PROVIDER NOTES
EMERGENCY DEPARTMENT ENCOUNTER    Pt Name: Yarelis Barragan  MRN: [id number]  Pt :   1940  Room Number:    Date of encounter:  2022  PCP: Carlos Shah MD  ED Provider: SABA Nuñez    Historian: Patient      HPI:  Chief Complaint: Sore throat and shortness of breath        Context: Yarelis Barragan is a 82 y.o. female who presents to the ED c/o sore throat and shortness of breath with cough.  Patient states her symptoms began on Tuesday, 2 nights ago.  She tested positive for COVID at home yesterday.  Her chief discomfort is located in her throat and she feels like she is having very painful swallowing and subsequently, poor hydration and nutrition consumption.  She denies any chest pain but feels the pain in her throat is radiating from ear to ear.  She has a mild cough and she feels short of breath at rest and with exertion.  She denies any nausea, vomiting, diarrhea or abdominal pain.  She feels generally weak.    Review of systems is negative for fever chills or other recent illness.  Negative for chest pain but positive for cough and shortness of breath as aforementioned.  GI as aforementioned.  : Negative for dysuria, frequency or urgency.  Positive for generalized weakness with orthostatic dizziness consistent with vertigo that she recognizes is similar presentation in intensity to previous occasions of vertigo.  No neurosensory deficits or focal weakness.      PAST MEDICAL HISTORY  History reviewed. No pertinent past medical history.      PAST SURGICAL HISTORY  Past Surgical History:   Procedure Laterality Date   • HYSTERECTOMY      AGE 52   • OOPHORECTOMY Bilateral     AGE 52         FAMILY HISTORY  Family History   Problem Relation Age of Onset   • Breast cancer Paternal Grandmother 50   • Ovarian cancer Neg Hx          SOCIAL HISTORY  Social History     Socioeconomic History   • Marital status:    Tobacco Use   • Smoking status: Never   Substance and Sexual  Activity   • Alcohol use: Not Currently   • Drug use: Never   • Sexual activity: Defer         ALLERGIES  Patient has no known allergies.        REVIEW OF SYSTEMS  Review of Systems     All systems reviewed and negative except for those discussed in HPI.       PHYSICAL EXAM    I have reviewed the triage vital signs and nursing notes.    ED Triage Vitals [12/22/22 0700]   Temp Heart Rate Resp BP SpO2   97.9 °F (36.6 °C) 94 18 (!) 150/107 93 %      Temp src Heart Rate Source Patient Position BP Location FiO2 (%)   Oral Monitor Sitting Left arm --       Physical Exam  GENERAL:   Appears in no acute distress.  She is ambulatory effectively and an excellent historian.  Her oxygen saturation is 96%.  HENT: Nares patent.  Posterior pharynx is benign.  No local lymphadenopathy is appreciated  EYES: No scleral icterus.  CV: Regular rhythm, regular rate.  Heart rate in the 90s.  No peripheral edema  RESPIRATORY: Normal effort.  No audible wheezes, rales or rhonchi.  ABDOMEN: Soft, nontender  MUSCULOSKELETAL: No deformities.   NEURO: Alert, moves all extremities, follows commands.  No neurosensory deficit or focal weakness  SKIN: Warm, dry, no rash visualized.      LAB RESULTS  Recent Results (from the past 24 hour(s))   Rapid Strep A Screen - Swab, Throat    Collection Time: 12/22/22  7:14 AM    Specimen: Throat; Swab   Result Value Ref Range    Strep A Ag Negative Negative   COVID-19 and FLU A/B PCR - Swab, Nasopharynx    Collection Time: 12/22/22  7:14 AM    Specimen: Nasopharynx; Swab   Result Value Ref Range    COVID19 Detected (C) Not Detected - Ref. Range    Influenza A PCR Not Detected Not Detected    Influenza B PCR Not Detected Not Detected   Lactic Acid, Plasma    Collection Time: 12/22/22  7:32 AM    Specimen: Blood   Result Value Ref Range    Lactate 0.8 0.5 - 2.0 mmol/L   CBC Auto Differential    Collection Time: 12/22/22  7:32 AM    Specimen: Blood   Result Value Ref Range    WBC 4.95 3.40 - 10.80 10*3/mm3     RBC 4.41 3.77 - 5.28 10*6/mm3    Hemoglobin 13.5 12.0 - 15.9 g/dL    Hematocrit 40.1 34.0 - 46.6 %    MCV 90.9 79.0 - 97.0 fL    MCH 30.6 26.6 - 33.0 pg    MCHC 33.7 31.5 - 35.7 g/dL    RDW 12.2 (L) 12.3 - 15.4 %    RDW-SD 40.5 37.0 - 54.0 fl    MPV 9.5 6.0 - 12.0 fL    Platelets 191 140 - 450 10*3/mm3    Neutrophil % 62.3 42.7 - 76.0 %    Lymphocyte % 21.6 19.6 - 45.3 %    Monocyte % 14.5 (H) 5.0 - 12.0 %    Eosinophil % 1.0 0.3 - 6.2 %    Basophil % 0.4 0.0 - 1.5 %    Immature Grans % 0.2 0.0 - 0.5 %    Neutrophils, Absolute 3.08 1.70 - 7.00 10*3/mm3    Lymphocytes, Absolute 1.07 0.70 - 3.10 10*3/mm3    Monocytes, Absolute 0.72 0.10 - 0.90 10*3/mm3    Eosinophils, Absolute 0.05 0.00 - 0.40 10*3/mm3    Basophils, Absolute 0.02 0.00 - 0.20 10*3/mm3    Immature Grans, Absolute 0.01 0.00 - 0.05 10*3/mm3    nRBC 0.0 0.0 - 0.2 /100 WBC   Urinalysis With Microscopic If Indicated (No Culture) - Urine, Clean Catch    Collection Time: 12/22/22  7:33 AM    Specimen: Urine, Clean Catch   Result Value Ref Range    Color, UA Yellow Yellow, Straw    Appearance, UA Clear Clear    pH, UA 5.5 5.0 - 8.0    Specific Gravity, UA 1.016 1.001 - 1.030    Glucose, UA Negative Negative    Ketones, UA Negative Negative    Bilirubin, UA Negative Negative    Blood, UA Negative Negative    Protein, UA Negative Negative    Leuk Esterase, UA Negative Negative    Nitrite, UA Negative Negative    Urobilinogen, UA 0.2 E.U./dL 0.2 - 1.0 E.U./dL   Comprehensive Metabolic Panel    Collection Time: 12/22/22  7:47 AM    Specimen: Blood   Result Value Ref Range    Glucose 104 (H) 65 - 99 mg/dL    BUN 14 8 - 23 mg/dL    Creatinine 0.77 0.57 - 1.00 mg/dL    Sodium 141 136 - 145 mmol/L    Potassium 4.2 3.5 - 5.2 mmol/L    Chloride 109 (H) 98 - 107 mmol/L    CO2 20.0 (L) 22.0 - 29.0 mmol/L    Calcium 8.9 8.6 - 10.5 mg/dL    Total Protein 6.2 6.0 - 8.5 g/dL    Albumin 4.00 3.50 - 5.20 g/dL    ALT (SGPT) 15 1 - 33 U/L    AST (SGOT) 21 1 - 32 U/L    Alkaline  Phosphatase 70 39 - 117 U/L    Total Bilirubin 0.4 0.0 - 1.2 mg/dL    Globulin 2.2 gm/dL    A/G Ratio 1.8 g/dL    BUN/Creatinine Ratio 18.2 7.0 - 25.0    Anion Gap 12.0 5.0 - 15.0 mmol/L    eGFR 77.6 >60.0 mL/min/1.73   BNP    Collection Time: 12/22/22  7:47 AM    Specimen: Blood   Result Value Ref Range    proBNP 166.1 0.0 - 1,800.0 pg/mL   Procalcitonin    Collection Time: 12/22/22  7:47 AM    Specimen: Blood   Result Value Ref Range    Procalcitonin 0.06 0.00 - 0.25 ng/mL   Magnesium    Collection Time: 12/22/22  7:47 AM    Specimen: Blood   Result Value Ref Range    Magnesium 1.8 1.6 - 2.4 mg/dL   Gold Top - SST    Collection Time: 12/22/22  7:47 AM   Result Value Ref Range    Extra Tube Hold for add-ons.    Light Blue Top    Collection Time: 12/22/22  7:47 AM   Result Value Ref Range    Extra Tube Hold for add-ons.    Troponin    Collection Time: 12/22/22  8:07 AM    Specimen: Blood   Result Value Ref Range    Troponin T <0.010 0.000 - 0.030 ng/mL   ECG 12 Lead Dyspnea    Collection Time: 12/22/22  8:15 AM   Result Value Ref Range    QT Interval 366 ms    QTC Interval 445 ms       If labs were ordered, I independently reviewed the results and considered them in treating the patient.        RADIOLOGY  CT Head Without Contrast    Result Date: 12/22/2022  CT HEAD WO CONTRAST-  Date of Exam: 12/22/2022 2:03 PM  Indication: abnormal findings on CT today; subdural hematoma.  Comparison: 12/22/2022 CT soft tissue neck earlier same day  Technique:  Without contrast, contiguous axial CT images of the head were obtained from skull base to vertex.  Coronal and sagittal reconstructions were performed.  Automated exposure control and iterative reconstruction methods were used.  FINDINGS PET/CT demonstrate symmetric widening of the anterior CSF spaces bilaterally. This is likely due to atrophy rather than a chronic subdural hematoma or hygroma. The previous study of the soft tissue neck was performed with the head somewhat  angled in the gantry, which cause this to look asymmetric. There is mild periventricular white matter hypodensity most commonly secondary to chronic small vessel ischemic change. Medial basal ganglia calcifications are noted. There is no evidence of mass effect, midline shift. Evaluation for hemorrhage is limited due to persistent contrast opacification within the vasculature from earlier prior study. No pathologic parenchymal enhancement is identified. There is normal gray-white differentiation. Atherosclerotic vascular calcification is noted. Orbits are unremarkable for age. Visualized paranasal sinuses and mastoid air cells are clear. No acute calvarial defects are seen.       1. Frontal parenchymal volume loss resulting in widening of the anterior CSF spaces bilaterally. Earlier soft tissue neck CT was performed with the head angled in the gantry which gave an asymmetric appearance to the anterior CSF spaces. No evidence of subdural hematoma or hygroma on the current study. 2. Parenchymal volume loss and probable chronic small vessel ischemic change. Brain MRI is more sensitive to evaluate for acute or subacute infarcts and to evaluate for intracranial metastatic disease.  This report was finalized on 12/22/2022 2:22 PM by Osiel Carson MD.      CT Soft Tissue Neck With Contrast    Result Date: 12/22/2022  CT SOFT TISSUE NECK W CONTRAST-  Date of Exam: 12/22/2022 10:58 AM  Indication: foreign body sensation in painful throat  Comparison: None available.  Technique: Contiguous axial imaging of the neck was performed after uneventful administration of 100 cc Isovue-370 .  Coronal and sagittal reconstructions were performed.  Automated exposure control and iterative reconstruction methods were used.  FINDINGS: There is a small fluid density extra-axial collection at the left frontal region measuring 7 mm. This is suggestive of a chronic subdural hematoma or hygroma. Visualized basilar portions of brain and skull  base are otherwise grossly unremarkable. There is no pathologic enhancement. Visualized paranasal sinuses and mastoid air cells are clear. Visualized orbits are unremarkable for age. Parotid and submandibular glands appears symmetric. There is a 9 mm indeterminate hypodense lesion in the inferior right thyroid lobe. No cervical adenopathy is identified. Parapharyngeal soft tissues are unremarkable. The epiglottis and periepiglottic tissues appear symmetric and unremarkable. No abnormal fluid collections or inflammatory changes.  Atherosclerotic vascular calcification is present. There is degenerative change in cervical spine. No aggressive osseous lesions are identified.       1. No acute soft tissue abnormality identified in the neck. 2. 7 mm fluid density extra-axial collection partially included over the left frontal region suggesting a chronic subdural hematoma or hygroma. 3. 9 mm indeterminate right thyroid nodule.    This report was finalized on 12/22/2022 12:59 PM by Osiel Carson MD.      CT Angiogram Chest    Result Date: 12/22/2022  CT ANGIOGRAM CHEST-  Date of Exam: 12/22/2022 10:58 AM  Indication: dyspnea; covid.  Comparison: None available.  Technique: Contiguous axial CTA imaging of the chest was obtained following the uneventful intravenous administration of 100 cc Isovue-370 contrast. Reconstructions in the coronal and sagittal planes were also performed. 3-D reconstructed images were also created and reviewed. Automated exposure control and iterative reconstruction methods were used.  FINDINGS: There are no pulmonary arterial filling defects evident to suggest PE. Pulmonary arteries appear grossly normal caliber. Thoracic aorta is normal caliber. There is mild scarring in the lung apices. There is patchy groundglass opacity in the lung bases bilaterally which may be due to atypical infiltrate or dependent atelectasis. No pleural fluid is seen. No pulmonary nodules or masses are identified. Central  airways are grossly patent. The stomach or hilar adenopathy is identified. No axillary or supraclavicular adenopathy is identified. Limited imaging through the upper abdomen demonstrates a grossly normal adrenal morphology. There is a small hiatal hernia. Small amount of pericardial fluid is noted. There are mild degenerative changes in the spine. No aggressive osseous lesions are identified.       1. No evidence of pulmonary embolus. 2. Nonspecific groundglass opacities in the bases may be due to early atypical infiltrate or dependent atelectasis.  This report was finalized on 12/22/2022 12:45 PM by Osiel Carson MD.          PROCEDURES    Procedures    ECG 12 Lead Dyspnea   Final Result   Test Reason : Dyspnea   Blood Pressure :   */*   mmHG   Vent. Rate :  89 BPM     Atrial Rate :  89 BPM      P-R Int : 156 ms          QRS Dur :  80 ms       QT Int : 366 ms       P-R-T Axes :  58 -42  42 degrees      QTc Int : 445 ms      Sinus rhythm with occasional premature ventricular complexes   Left axis deviation   Abnormal ECG   No previous ECGs available   Confirmed by MD Ever, Nathan ([id number]) on 12/22/2022 11:44:20 AM      Referred By: ED MD           Confirmed By: Nathan Curtis MD          MEDICATIONS GIVEN IN ER    Medications   sodium chloride 0.9 % flush 10 mL (has no administration in time range)   Pharmacy Consult - Remdesivir (has no administration in time range)   remdesivir 200 mg in sodium chloride 0.9 % 290 mL IVPB (powder vial) (has no administration in time range)     Followed by   remdesivir 100 mg in sodium chloride 0.9 % 250 mL IVPB (powder vial) (has no administration in time range)   dexamethasone (DECADRON) injection 6 mg (has no administration in time range)   ondansetron (ZOFRAN) injection 4 mg (has no administration in time range)   magnesium sulfate 4 gram infusion - Mg less than or equal to 1mg/dL (has no administration in time range)     Or   magnesium sulfate 3 gram infusion (1gm x 3) - Mg 1.1 -  1.5 mg/dL (has no administration in time range)     Or   Magnesium Sulfate 2 gram infusion- Mg 1.6 - 1.9 mg/dL (has no administration in time range)   Enoxaparin Sodium (LOVENOX) syringe 40 mg (has no administration in time range)   phenol (CHLORASEPTIC) 1.4 % liquid 2 spray (has no administration in time range)   albuterol sulfate HFA (PROVENTIL HFA;VENTOLIN HFA;PROAIR HFA) inhaler 2 puff (has no administration in time range)   acetaminophen (TYLENOL) tablet 650 mg (has no administration in time range)   iopamidol (ISOVUE-370) 76 % injection 100 mL (100 mL Intravenous Given 12/22/22 1114)   fentaNYL citrate (PF) (SUBLIMAZE) injection 25 mcg (25 mcg Intravenous Given 12/22/22 1345)   ondansetron (ZOFRAN) injection 4 mg (4 mg Intravenous Given 12/22/22 1345)           Orders placed during this visit:  Orders Placed This Encounter   Procedures   • COVID PRE-OP / PRE-PROCEDURE SCREENING ORDER (NO ISOLATION) - Swab, Nasopharynx   • Rapid Strep A Screen - Swab, Throat   • COVID-19 and FLU A/B PCR - Swab, Nasopharynx   • Blood Culture - Blood,   • Blood Culture - Blood,   • Beta Strep Culture, Throat - Swab, Throat   • CT Angiogram Chest   • CT Soft Tissue Neck With Contrast   • CT Head Without Contrast   • Comprehensive Metabolic Panel   • Urinalysis With Microscopic If Indicated (No Culture) - Urine, Clean Catch   • BNP   • Troponin   • Lactic Acid, Plasma   • Procalcitonin   • Magnesium   • CBC Auto Differential   • [place] Draw   • C-reactive Protein   • Sedimentation Rate   • Comprehensive Metabolic Panel   • CBC (No Diff)   • C-reactive Protein   • Magnesium   • Magnesium   • Potassium   • Cardiac Monitoring   • Patient Currently On Electrolyte Replacement Protocol - Please Refer to MAR for Protocol Details  Misc Nursing Order (Specify)   • Strict Intake & Output   • ECG 12 Lead Dyspnea   • Insert peripheral IV   • Inpatient Admission   • ED Bed Request   • CBC & Differential   • Gold Top - SST   • Light Blue Top          Additional orders considered but not ordered:      ED Course:    Consultants:      ED Course as of 12/22/22 1504   u Dec 22, 2022   1427 Patient's work-up is remarkable for detection of COVID-19.  She has had some moments of hypoxia here in the ED.  She is also very uncomfortable continued complaint of throat pain.  Her other vital signs of been stable without hypotension or tachycardia.  CT imaging negative for pulmonary embolus. [MS]   1502 CT imaging of the head negative for acute findings.  Admission determined due to patient's oxygen hunger and hypoxia.  Patient understands and concurs with this inpatient plan.  Dr. Patience Alexander accepts inpatient care [MS]      ED Course User Index  [MS] Savanna Cook, SABA                  AS OF 15:04 EST VITALS:    BP - 133/75  HR - 93  TEMP - 97.9 °F (36.6 °C) (Oral)  O2 SATS - 90%                  DIAGNOSIS  Final diagnoses:   COVID-19   Hypoxia   Sore throat         DISPOSITION    Admitted         Please note that portions of this document were completed with voice recognition software.      Savanna Cook, SABA  12/22/22 1503

## 2022-12-23 LAB
ALBUMIN SERPL-MCNC: 4.1 G/DL (ref 3.5–5.2)
ALBUMIN/GLOB SERPL: 2.1 G/DL
ALP SERPL-CCNC: 66 U/L (ref 39–117)
ALT SERPL W P-5'-P-CCNC: 14 U/L (ref 1–33)
ANION GAP SERPL CALCULATED.3IONS-SCNC: 14 MMOL/L (ref 5–15)
AST SERPL-CCNC: 19 U/L (ref 1–32)
BILIRUB SERPL-MCNC: 0.3 MG/DL (ref 0–1.2)
BUN SERPL-MCNC: 14 MG/DL (ref 8–23)
BUN/CREAT SERPL: 18.9 (ref 7–25)
CALCIUM SPEC-SCNC: 8.4 MG/DL (ref 8.6–10.5)
CHLORIDE SERPL-SCNC: 106 MMOL/L (ref 98–107)
CO2 SERPL-SCNC: 18 MMOL/L (ref 22–29)
CREAT SERPL-MCNC: 0.74 MG/DL (ref 0.57–1)
CRP SERPL-MCNC: 1.77 MG/DL (ref 0–0.5)
DEPRECATED RDW RBC AUTO: 40.6 FL (ref 37–54)
EGFRCR SERPLBLD CKD-EPI 2021: 81.4 ML/MIN/1.73
ERYTHROCYTE [DISTWIDTH] IN BLOOD BY AUTOMATED COUNT: 12 % (ref 12.3–15.4)
GLOBULIN UR ELPH-MCNC: 2 GM/DL
GLUCOSE SERPL-MCNC: 145 MG/DL (ref 65–99)
HCT VFR BLD AUTO: 39.5 % (ref 34–46.6)
HGB BLD-MCNC: 12.9 G/DL (ref 12–15.9)
MAGNESIUM SERPL-MCNC: 2.4 MG/DL (ref 1.6–2.4)
MCH RBC QN AUTO: 30 PG (ref 26.6–33)
MCHC RBC AUTO-ENTMCNC: 32.7 G/DL (ref 31.5–35.7)
MCV RBC AUTO: 91.9 FL (ref 79–97)
PLATELET # BLD AUTO: 207 10*3/MM3 (ref 140–450)
PMV BLD AUTO: 9.6 FL (ref 6–12)
POTASSIUM SERPL-SCNC: 4.4 MMOL/L (ref 3.5–5.2)
PROT SERPL-MCNC: 6.1 G/DL (ref 6–8.5)
RBC # BLD AUTO: 4.3 10*6/MM3 (ref 3.77–5.28)
SODIUM SERPL-SCNC: 138 MMOL/L (ref 136–145)
WBC NRBC COR # BLD: 6.78 10*3/MM3 (ref 3.4–10.8)

## 2022-12-23 PROCEDURE — 83735 ASSAY OF MAGNESIUM: CPT | Performed by: INTERNAL MEDICINE

## 2022-12-23 PROCEDURE — 25010000002 ENOXAPARIN PER 10 MG: Performed by: INTERNAL MEDICINE

## 2022-12-23 PROCEDURE — 86140 C-REACTIVE PROTEIN: CPT | Performed by: INTERNAL MEDICINE

## 2022-12-23 PROCEDURE — 99232 SBSQ HOSP IP/OBS MODERATE 35: CPT | Performed by: FAMILY MEDICINE

## 2022-12-23 PROCEDURE — 80053 COMPREHEN METABOLIC PANEL: CPT | Performed by: INTERNAL MEDICINE

## 2022-12-23 PROCEDURE — 25010000002 DEXAMETHASONE PER 1 MG: Performed by: INTERNAL MEDICINE

## 2022-12-23 PROCEDURE — 97165 OT EVAL LOW COMPLEX 30 MIN: CPT

## 2022-12-23 PROCEDURE — 25010000002 REMDESIVIR 100 MG/20ML SOLUTION 1 EACH VIAL: Performed by: INTERNAL MEDICINE

## 2022-12-23 PROCEDURE — 97161 PT EVAL LOW COMPLEX 20 MIN: CPT

## 2022-12-23 PROCEDURE — 85027 COMPLETE CBC AUTOMATED: CPT | Performed by: INTERNAL MEDICINE

## 2022-12-23 RX ADMIN — Medication 10 ML: at 20:28

## 2022-12-23 RX ADMIN — GUAIFENESIN AND DEXTROMETHORPHAN 5 ML: 100; 10 SYRUP ORAL at 16:31

## 2022-12-23 RX ADMIN — PHENOL 2 SPRAY: 1.4 SPRAY ORAL at 08:58

## 2022-12-23 RX ADMIN — Medication 10 ML: at 08:59

## 2022-12-23 RX ADMIN — ACETAMINOPHEN 650 MG: 160 SOLUTION ORAL at 23:58

## 2022-12-23 RX ADMIN — DEXAMETHASONE SODIUM PHOSPHATE 6 MG: 4 INJECTION INTRA-ARTICULAR; INTRALESIONAL; INTRAMUSCULAR; INTRAVENOUS; SOFT TISSUE at 08:58

## 2022-12-23 RX ADMIN — ENOXAPARIN SODIUM 40 MG: 40 INJECTION SUBCUTANEOUS at 20:27

## 2022-12-23 RX ADMIN — ACETAMINOPHEN 650 MG: 160 SOLUTION ORAL at 16:31

## 2022-12-23 RX ADMIN — SENNOSIDES 10 ML: 8.8 LIQUID ORAL at 20:35

## 2022-12-23 RX ADMIN — ACETAMINOPHEN 650 MG: 160 SOLUTION ORAL at 03:53

## 2022-12-23 RX ADMIN — SENNOSIDES 10 ML: 8.8 LIQUID ORAL at 12:22

## 2022-12-23 RX ADMIN — GUAIFENESIN AND DEXTROMETHORPHAN 5 ML: 100; 10 SYRUP ORAL at 08:58

## 2022-12-23 RX ADMIN — PHENOL 2 SPRAY: 1.4 SPRAY ORAL at 16:32

## 2022-12-23 RX ADMIN — REMDESIVIR 100 MG: 100 INJECTION, POWDER, LYOPHILIZED, FOR SOLUTION INTRAVENOUS at 16:31

## 2022-12-23 NOTE — CASE MANAGEMENT/SOCIAL WORK
Discharge Planning Assessment  [facility]     Patient Name: Yarelis Barragan  MRN: [id number]  Today's Date: 12/23/2022    Admit Date: 12/22/2022    Plan: discharge plan   Discharge Needs Assessment     Row Name 12/23/22 1527       Living Environment    People in Home spouse    Name(s) of People in Home Panchito Barragan(spouse)    Current Living Arrangements home    Primary Care Provided by self    Provides Primary Care For no one, unable/limited ability to care for self    Family Caregiver if Needed spouse    Family Caregiver Names Panchito Barragan(spouse)    Quality of Family Relationships helpful;involved;supportive    Able to Return to Prior Arrangements yes    Living Arrangement Comments Pt in isolation due to covid. I attempted to call room and no answer. I spoke with pt's spouse, Panchito on cell phone. Pt resides in [place] with spouse.       Resource/Environmental Concerns    Resource/Environmental Concerns none       Transition Planning    Patient/Family Anticipates Transition to home with family    Patient/Family Anticipated Services at Transition     Transportation Anticipated family or friend will provide       Discharge Needs Assessment    Readmission Within the Last 30 Days no previous admission in last 30 days    Equipment Currently Used at Home none  Spouse reports pt uses no medical equipment at home.    Concerns to be Addressed discharge planning    Equipment Needed After Discharge none    Discharge Coordination/Progress I confirmed with spouse that pt has [employer] Medicare with prescription coverage. Pt uses mail order pharmacy and [facility] on [address]. Spouse states pt is independent with ADLs and still drives. Pt came in with c/os sore throat, fatigue and diagnosed with covid. Plan is home with spouse at discharge. Spouse denies discharge needs at this time. CM will cont to follow.               Discharge Plan     Row Name 12/23/22 5042       Plan    Plan discharge plan     Plan Comments The plan is home with spouse at discharge. Spouse does not anticipate discharge needs at this time. CM will cont to follow.    Final Discharge Disposition Code 01 - home or self-care              Continued Care and Services - Admitted Since 12/22/2022    Coordination has not been started for this encounter.       Expected Discharge Date and Time     Expected Discharge Date Expected Discharge Time    Dec 26, 2022          Demographic Summary     Row Name 12/23/22 1526       General Information    General Information Comments PCP is KAVITHA LAMA       Contact Information    Permission Granted to Share Info With     Contact Information Obtained for     Contact Information Comments Panchito Barragan(spouse) [phone]               Functional Status    No documentation.                Psychosocial    No documentation.                Abuse/Neglect    No documentation.                Legal    No documentation.                Substance Abuse    No documentation.                Patient Forms    No documentation.                   Brenda Morales RN

## 2022-12-23 NOTE — PLAN OF CARE
Goal Outcome Evaluation:  Plan of Care Reviewed With: patient           Outcome Evaluation: OT eval complete. Based on pt's current functional status, no further skilled IPOT services warranted at this time. Pt educated on and demo'd ability to perform BUE Pulmonary HEP. Rec continued activity as tolerated w/ nursing staff, home w/ A at d/c.

## 2022-12-23 NOTE — PLAN OF CARE
Goal Outcome Evaluation:  Plan of Care Reviewed With: patient           Outcome Evaluation: Patient presents with deficits in balance and endurance. Today she ambulated CGA in room 20ft+120ft on RA with CGA and no AD. O2 sat at 92% following each bout of ambulation. IPPT indicated to address deficits. Will recommend she D/C home with assist.

## 2022-12-23 NOTE — THERAPY DISCHARGE NOTE
Acute Care - Occupational Therapy Discharge  [facility]    Patient Name: Yarelis Barragan  : 1940    MRN: [id number]                              Today's Date: 2022       Admit Date: 2022    Visit Dx:     ICD-10-CM ICD-9-CM   1. COVID-19  U07.1 079.89   2. Hypoxia  R09.02 799.02   3. Sore throat  J02.9 462     Patient Active Problem List   Diagnosis   • COVID-19   • Bronchitis   • Hypoxia   • Sore throat   • Fatigue     History reviewed. No pertinent past medical history.  Past Surgical History:   Procedure Laterality Date   • HYSTERECTOMY      AGE 52   • OOPHORECTOMY Bilateral     AGE 52      General Information     Row Name 22 1408          OT Time and Intention    Document Type discharge evaluation/summary  -     Mode of Treatment occupational therapy  -     Row Name 22 1408          General Information    Patient Profile Reviewed yes  -     Prior Level of Function independent:;bed mobility;ADL's;transfer;all household mobility;community mobility;driving  -     Existing Precautions/Restrictions fall  -     Barriers to Rehab none identified  -     Row Name 22 1408          Living Environment    People in Home spouse  -     Row Name 22 1408          Home Main Entrance    Number of Stairs, Main Entrance one  -     Stair Railings, Main Entrance railings on both sides of stairs  -     Row Name 22 1408          Stairs Within Home, Primary    Number of Stairs, Within Home, Primary none  -     Row Name 22 1408          Cognition    Orientation Status (Cognition) oriented x 4  -     Row Name 22 1408          Safety Issues, Functional Mobility    Impairments Affecting Function (Mobility) shortness of breath;endurance/activity tolerance;pain  -           User Key  (r) = Recorded By, (t) = Taken By, (c) = Cosigned By    Initials Name Provider Type    Belen Keating OT Occupational Therapist               Mobility/ADL's     Row  Name 12/23/22 1409          Bed Mobility    Bed Mobility supine-sit;sit-supine  -     Supine-Sit [place] (Bed Mobility) modified independence  -     Sit-Supine [place] (Bed Mobility) modified independence  -     Assistive Device (Bed Mobility) head of bed elevated;bed rails  -     Row Name 12/23/22 1409          Transfers    Transfers sit-stand transfer;stand-sit transfer;toilet transfer  -     Row Name 12/23/22 1409          Sit-Stand Transfer    Sit-Stand [place] (Transfers) standby assist  -     Row Name 12/23/22 1409          Stand-Sit Transfer    Stand-Sit [place] (Transfers) standby assist  -     Row Name 12/23/22 1409          Toilet Transfer    Type (Toilet Transfer) sit-stand;stand-sit  -     [place] Level (Toilet Transfer) standby assist  -     Row Name 12/23/22 1409          Functional Mobility    Functional Mobility- Ind. Level [facility] assist  [facility]     Functional Mobility-Distance (Feet) --  household distance in room  -     Row Name 12/23/22 1409          Activities of Daily Living    BADL Assessment/Intervention lower body dressing;grooming;toileting  -     Row Name 12/23/22 1409          Lower Body Dressing Assessment/Training    [place] Level (Lower Body Dressing) don;socks;independent  -     Position (Lower Body Dressing) edge of bed sitting  -     Row Name 12/23/22 1409          Grooming Assessment/Training    [place] Level (Grooming) wash face, hands;hair care, combing/brushing;oral care regimen;standby assist  -     Position (Grooming) sink side  -     Row Name 12/23/22 1409          Toileting Assessment/Training    [place] Level (Toileting) adjust/manage clothing;perform perineal hygiene;standby assist  -     Assistive Devices (Toileting) commode;grab bar/safety frame  -     Position (Toileting) unsupported sitting  -           User Key  (r) = Recorded By, (t) = Taken By, (c) = Cosigned By    Initials Name Provider Type      Belen Haas OT Occupational Therapist               Obj/Interventions     [facility] Name 12/23/22 1410          Sensory Assessment (Somatosensory)    Sensory Assessment (Somatosensory) UE sensation intact  -[facility] Name 12/23/22 1410          Vision Assessment/Intervention    Visual Impairment/Limitations WFL  -[facility] Name 12/23/22 1410          Range of Motion Comprehensive    General Range of Motion bilateral upper extremity ROM WFL  -[facility] Name 12/23/22 1410          Strength Comprehensive (MMT)    General Manual Muscle Testing (MMT) Assessment upper extremity strength deficits identified  -     Comment, General Manual Muscle Testing (MMT) Assessment BUE grossly 4+/5  -[facility] Name 12/23/22 1410          Shoulder (Therapeutic Exercise)    Shoulder (Therapeutic Exercise) AROM (active range of motion)  -     Shoulder AROM (Therapeutic Exercise) bilateral;flexion;extension;horizontal aBduction/aDduction;scapular retraction;10 repetitions;supine;sitting  -[facility] Name 12/23/22 1410          Elbow/Forearm (Therapeutic Exercise)    Elbow/Forearm (Therapeutic Exercise) AROM (active range of motion)  -     Elbow/Forearm AROM (Therapeutic Exercise) bilateral;flexion;extension;10 repetitions;sitting  -[facility] Name 12/23/22 1410          Motor Skills    Motor Skills coordination  -     Coordination WFL;bilateral;upper extremity;bimanual skills  -     Therapeutic Exercise shoulder;elbow/forearm  Pt educated on BUE Pulmonary HEP, pt verbalized understanding  -[facility] Name 12/23/22 1410          Balance    Balance Assessment sitting static balance;sitting dynamic balance;sit to stand dynamic balance;standing static balance;standing dynamic balance  -     Static Sitting Balance independent  -     Dynamic Sitting Balance standby assist  -     Position, Sitting Balance unsupported;sitting edge of bed;other (see comments)  commode  -     Sit to Stand Dynamic Balance standby assist   -     Static Standing Balance standby assist  -     Dynamic Standing Balance standby assist  -     Position/Device Used, Standing Balance unsupported  -     Balance Interventions sitting;sit to stand;occupation based/functional task  -           User Key  (r) = Recorded By, (t) = Taken By, (c) = Cosigned By    Initials Name Provider Type     Belen Haas, MARRY Occupational Therapist               Goals/Plan    No documentation.                Clinical Impression     [facility] Name 12/23/22 1418          Pain Assessment    Pain Intervention(s) Repositioned;Ambulation/increased activity  -     Additional Documentation Pain Scale: FACES Pre/Post-Treatment (Group)  -[facility] Name 12/23/22 1418          Pain Scale: FACES Pre/Post-Treatment    Pain: FACES Scale, Pretreatment 4-->hurts little more  -     Posttreatment Pain Rating 4-->hurts little more  -     Pain Location generalized  -     Pain Location - throat  -[facility] Name 12/23/22 1418          Plan of Care Review    Plan of Care Reviewed With patient  -     Outcome Evaluation OT eval complete. Based on pt's current functional status, no further skilled IPOT services warranted at this time. Pt educated on and demo'd ability to perform BUE Pulmonary HEP. Rec continued activity as tolerated w/ nursing staff, home w/ A at d/c.  -[facility] Name 12/23/22 1418          Therapy Assessment/Plan (OT)    Criteria for Skilled Therapeutic Interventions Met (OT) no problems identified which require skilled intervention  -     Therapy Frequency (OT) evaluation only  -[facility] Name 12/23/22 1418          Therapy Plan Review/Discharge Plan (OT)    Anticipated Discharge Disposition (OT) home with assist  -[facility] Name 12/23/22 1418          Vital Signs    Pre Systolic BP Rehab --  VSS - RN cleared OT  -     O2 Delivery Pre Treatment nasal cannula  -     O2 Delivery Intra Treatment nasal cannula  -     O2 Delivery Post Treatment nasal cannula   -MC     Pre Patient Position Supine  -MC     Intra Patient Position Standing  -MC     Post Patient Position Supine  -MC     Row Name 12/23/22 1418          Positioning and Restraints    Pre-Treatment Position in bed  -MC     Post Treatment Position bed  -MC     In Bed notified nsg;supine;call light within reach;encouraged to call for assist;exit alarm on;side rails up x2  -           User Key  (r) = Recorded By, (t) = Taken By, (c) = Cosigned By    Initials Name Provider Type    Belen Keating, MARRY Occupational Therapist               Outcome Measures     Row Name 12/23/22 1420          How much help from another is currently needed...    Putting on and taking off regular lower body clothing? 4  -MC     Bathing (including washing, rinsing, and drying) 3  -MC     Toileting (which includes using toilet bed pan or urinal) 4  -MC     Putting on and taking off regular upper body clothing 4  -MC     Taking care of personal grooming (such as brushing teeth) 4  -MC     Eating meals 4  -     AM-[facility] 6 Clicks Score (OT) 23  -     Row Name 12/23/22 1029          How much help from another person do you currently need...    Turning from your back to your side while in flat bed without using bedrails? 4  -CM     Moving from lying on back to sitting on the side of a flat bed without bedrails? 4  -CM     Moving to and from a bed to a chair (including a wheelchair)? 3  -CM     Standing up from a chair using your arms (e.g., wheelchair, bedside chair)? 3  -CM     Climbing 3-5 steps with a railing? 3  -CM     To walk in hospital room? 3  -CM     AM-PAC 6 Clicks Score (PT) 20  -CM     Highest level of mobility 6 --> Walked 10 steps or more  -CM     Row Name 12/23/22 1420 12/23/22 1029       Functional Assessment    Outcome Measure \[facility]\"" AM-PAC 6 Clicks Daily Activity (OT)  - AM-[facility] 6 Clicks Basic Mobility (PT)  -CM          User Key  (r) = Recorded By, (t) = Taken By, (c) = Cosigned By    Initials Name Provider Type     Belen Keating OT Occupational Therapist    Bonnie Maravilla, PT Physical Therapist              Occupational Therapy Education     Title: PT OT SLP Therapies (In Progress)     Topic: Occupational Therapy (Done)     Point: ADL training (Done)     Description:   Instruct learner(s) on proper safety adaptation and remediation techniques during self care or transfers.   Instruct in proper use of assistive devices.              Learning Progress Summary           Patient Acceptance, E, VU by  at 12/23/2022 1420                   Point: Home exercise program (Done)     Description:   Instruct learner(s) on appropriate technique for monitoring, assisting and/or progressing therapeutic exercises/activities.              Learning Progress Summary           Patient Acceptance, E, VU by  at 12/23/2022 1420                   Point: Precautions (Done)     Description:   Instruct learner(s) on prescribed precautions during self-care and functional transfers.              Learning Progress Summary           Patient Acceptance, E, VU by  at 12/23/2022 1420                   Point: Body mechanics (Done)     Description:   Instruct learner(s) on proper positioning and spine alignment during self-care, functional mobility activities and/or exercises.              Learning Progress Summary           Patient Acceptance, E, VU by  at 12/23/2022 1420                               User Key     Initials Effective Dates Name Provider Type Discipline     10/14/22 -  Belen Haas, OT Occupational Therapist OT              OT Recommendation and Plan  Therapy Frequency (OT): evaluation only  Plan of Care Review  Plan of Care Reviewed With: patient  Outcome Evaluation: OT eval complete. Based on pt's current functional status, no further skilled IPOT services warranted at this time. Pt educated on and demo'd ability to perform BUE Pulmonary HEP. Rec continued activity as tolerated w/ nursing staff, home w/ A at  d/c.  Plan of Care Reviewed With: patient  Outcome Evaluation: OT eval complete. Based on pt's current functional status, no further skilled IPOT services warranted at this time. Pt educated on and demo'd ability to perform BUE Pulmonary HEP. Rec continued activity as tolerated w/ nursing staff, home w/ A at d/c.     Time Calculation:    Time Calculation- OT     Row Name 12/23/22 1421             Time Calculation- OT    OT Start Time 1321  -MC      OT Received On 12/23/22  -MC         Untimed Charges    OT Eval/Re-eval Minutes 48  -MC         Total Minutes    Untimed Charges Total Minutes 48  -MC       Total Minutes 48  -MC            User Key  (r) = Recorded By, (t) = Taken By, (c) = Cosigned By    Initials Name Provider Type    Belen Keating OT Occupational Therapist              Therapy Charges for Today     Code Description Service Date Service Provider Modifiers Qty    62859735961 HC OT EVAL LOW COMPLEXITY 4 12/23/2022 Belen Haas OT GO 1             OT Discharge Summary  Anticipated Discharge Disposition (OT): home with assist    Belen Haas OT  12/23/2022

## 2022-12-23 NOTE — THERAPY EVALUATION
Patient Name: Yarelis Barragan  : 1940    MRN: [id number]                              Today's Date: 2022       Admit Date: 2022    Visit Dx:     ICD-10-CM ICD-9-CM   1. COVID-19  U07.1 079.89   2. Hypoxia  R09.02 799.02   3. Sore throat  J02.9 462     Patient Active Problem List   Diagnosis   • COVID-19   • Bronchitis   • Hypoxia   • Sore throat   • Fatigue     History reviewed. No pertinent past medical history.  Past Surgical History:   Procedure Laterality Date   • HYSTERECTOMY      AGE 52   • OOPHORECTOMY Bilateral     AGE 52      General Information     Row Name 22 1020          Physical Therapy Time and Intention    Document Type evaluation  -CM     Mode of Treatment physical therapy;individual therapy  -CM     Row Name 22 1020          General Information    Patient Profile Reviewed yes  -CM     Prior Level of Function independent:;all household mobility;ADL's;home management;driving  -CM     Existing Precautions/Restrictions fall  -CM     Barriers to Rehab none identified  -CM     Row Name 22 1020          Living Environment    People in Home spouse  -CM     Row Name 22 1020          Home Main Entrance    Number of Stairs, Main Entrance one  -CM     Stair Railings, Main Entrance railings on both sides of stairs  -CM     Row Name 22 1020          Stairs Within Home, Primary    Stair Railings, Within Home, Primary none  -CM     Row Name 22 1020          Cognition    Orientation Status (Cognition) oriented x 4  -CM     Row Name 22 1020          Safety Issues, Functional Mobility    Safety Issues Affecting Function (Mobility) safety precaution awareness  -CM     Impairments Affecting Function (Mobility) balance;endurance/activity tolerance  -CM           User Key  (r) = Recorded By, (t) = Taken By, (c) = Cosigned By    Initials Name Provider Type    CM Bonnie Bustamante PT Physical Therapist               Mobility     Row Name 22 1021           Bed Mobility    Bed Mobility supine-sit  -CM     Supine-Sit [place] (Bed Mobility) standby assist  -CM     Assistive Device (Bed Mobility) head of bed elevated  -CM     Comment, (Bed Mobility) patient demo good sequencing without VCs  -CM     Row Name 12/23/22 1021          Sit-Stand Transfer    Sit-Stand [place] (Transfers) contact guard  -CM     Comment, (Sit-Stand Transfer) patient performs without difficulty with no AD  -CM     Row Name 12/23/22 1021          Gait/Stairs (Locomotion)    [place] Level (Gait) contact guard  -CM     Distance in Feet (Gait) 20+120  -CM     Deviations/Abnormal Patterns (Gait) gait speed decreased;stride length decreased;weight shifting decreased  -CM     Bilateral Gait Deviations decreased arm swing  -CM     Comment, (Gait/Stairs) Patient ambulated in room on RA with no AD. She demonstrates step through gait pattern with decreased stride length. She does not experience any LOB and denies SOA. She takes one seated rest break per PT request for vital check. O2 sat at 92% on room air following both bouts of ambulation.  -CM           User Key  (r) = Recorded By, (t) = Taken By, (c) = Cosigned By    Initials Name Provider Type    Bonnie Maravilla, PT Physical Therapist               Obj/Interventions     Row Name 12/23/22 1024          Range of Motion Comprehensive    General Range of Motion bilateral lower extremity ROM WFL  -CM     Row Name 12/23/22 1024          Strength Comprehensive (MMT)    General Manual Muscle Testing (MMT) Assessment no strength deficits identified  -CM     Row Name 12/23/22 1024          Balance    Balance Assessment sitting static balance;standing static balance;standing dynamic balance  -CM     Static Sitting Balance independent  -CM     Position, Sitting Balance unsupported;sitting edge of bed  -CM     Static Standing Balance contact guard  -CM     Dynamic Standing Balance contact guard  -CM     Position/Device Used, Standing  Balance unsupported  -CM     Row Name 12/23/22 1024          Sensory Assessment (Somatosensory)    Sensory Assessment (Somatosensory) LE sensation intact  -CM           User Key  (r) = Recorded By, (t) = Taken By, (c) = Cosigned By    Initials Name Provider Type    Bonnie Maravilla, PT Physical Therapist               Goals/Plan     Row Name 12/23/22 1027          Bed Mobility Goal 1 (PT)    Activity/Assistive Device (Bed Mobility Goal 1, PT) sit to supine;supine to sit  -CM     [place] Level/Cues Needed (Bed Mobility Goal 1, PT) independent  -CM     Time Frame (Bed Mobility Goal 1, PT) long term goal (LTG);10 days  -CM     Row Name 12/23/22 1027          Transfer Goal 1 (PT)    Activity/Assistive Device (Transfer Goal 1, PT) sit-to-stand/stand-to-sit  -CM     [place] Level/Cues Needed (Transfer Goal 1, PT) independent  -CM     Time Frame (Transfer Goal 1, PT) long term goal (LTG);10 days  -CM     Row Name 12/23/22 1027          Gait Training Goal 1 (PT)    Activity/Assistive Device (Gait Training Goal 1, PT) gait (walking locomotion)  -CM     [place] Level (Gait Training Goal 1, PT) independent  -CM     Distance (Gait Training Goal 1, PT) 200ft  -CM     Time Frame (Gait Training Goal 1, PT) long term goal (LTG);10 days  -CM     Strategies/Barriers (Gait Training Goal 1, PT) on room air maintaining O2 sat >90%  -CM     Row Name 12/23/22 1027          Therapy Assessment/Plan (PT)    Planned Therapy Interventions (PT) balance training;bed mobility training;gait training;home exercise program;postural re-education;patient/family education;stretching;strengthening;transfer training  -CM           User Key  (r) = Recorded By, (t) = Taken By, (c) = Cosigned By    Initials Name Provider Type    Bonnie Maravilla, PT Physical Therapist               Clinical Impression     Row Name 12/23/22 1024          Pain    Additional Documentation Pain Scale: FACES Pre/Post-Treatment (Group)  -CM     Row  Name 12/23/22 1024          Pain Scale: FACES Pre/Post-Treatment    Pain: FACES Scale, Pretreatment 4-->hurts little more  -CM     Posttreatment Pain Rating 4-->hurts little more  -CM     Pain Location - throat  -CM     Row Name 12/23/22 1024          Plan of Care Review    Plan of Care Reviewed With patient  -CM     Outcome Evaluation Patient presents with deficits in balance and endurance. Today she ambulated CGA in room 20ft+120ft on RA with CGA and no AD. O2 sat at 92% following each bout of ambulation. IPPT indicated to address deficits. Will recommend she D/C home with assist.  -CM     Row Name 12/23/22 1024          Therapy Assessment/Plan (PT)    Rehab Potential (PT) good, to achieve stated therapy goals  -CM     Criteria for Skilled Interventions Met (PT) yes;meets criteria  -CM     Therapy Frequency (PT) daily  -CM     Row Name 12/23/22 1024          Vital Signs    Pre SpO2 (%) 94  -CM     O2 Delivery Pre Treatment nasal cannula  -CM     Intra SpO2 (%) 92  -CM     O2 Delivery Intra Treatment room air  -CM     Post SpO2 (%) 92  -CM     O2 Delivery Post Treatment nasal cannula  -CM     Pre Patient Position Supine  -CM     Intra Patient Position Sitting  -CM     Post Patient Position Sitting  -CM     Row Name 12/23/22 1024          Positioning and Restraints    Pre-Treatment Position in bed  -CM     Post Treatment Position chair  -CM     In Chair sitting;call light within reach;encouraged to call for assist;exit alarm on;notified nsg  -CM           User Key  (r) = Recorded By, (t) = Taken By, (c) = Cosigned By    Initials Name Provider Type    Bonnie Maravilla, PT Physical Therapist               Outcome Measures     Row Name 12/23/22 1029          How much help from another person do you currently need...    Turning from your back to your side while in flat bed without using bedrails? 4  -CM     Moving from lying on back to sitting on the side of a flat bed without bedrails? 4  -CM     Moving to and  from a bed to a chair (including a wheelchair)? 3  -CM     Standing up from a chair using your arms (e.g., wheelchair, bedside chair)? 3  -CM     Climbing 3-5 steps with a railing? 3  -CM     To walk in hospital room? 3  -CM     AM-PAC 6 Clicks Score (PT) 20  -CM     Highest level of mobility 6 --> Walked 10 steps or more  -CM     Row Name 12/23/22 1029          Functional Assessment    Outcome Measure Options AM-PAC 6 Clicks Basic Mobility (PT)  -CM           User Key  (r) = Recorded By, (t) = Taken By, (c) = Cosigned By    Initials Name Provider Type    CM Bonnie Bustamante, PT Physical Therapist                             Physical Therapy Education     Title: PT OT SLP Therapies (In Progress)     Topic: Physical Therapy (In Progress)     Point: Mobility training (Done)     Learning Progress Summary           Patient Acceptance, E, VU by CM at 12/23/2022 1029                   Point: Home exercise program (Not Started)     Learner Progress:  Not documented in this visit.          Point: Body mechanics (Not Started)     Learner Progress:  Not documented in this visit.          Point: Precautions (Done)     Learning Progress Summary           Patient Acceptance, E, VU by CM at 12/23/2022 1029                               User Key     Initials Effective Dates Name Provider Type Discipline     09/22/22 -  Bonnie Bustamante PT Physical Therapist PT              PT Recommendation and Plan  Planned Therapy Interventions (PT): balance training, bed mobility training, gait training, home exercise program, postural re-education, patient/family education, stretching, strengthening, transfer training  Plan of Care Reviewed With: patient  Outcome Evaluation: Patient presents with deficits in balance and endurance. Today she ambulated CGA in room 20ft+120ft on RA with CGA and no AD. O2 sat at 92% following each bout of ambulation. IPPT indicated to address deficits. Will recommend she D/C home with assist.     Time  Calculation:    PT Charges     Row Name 12/23/22 1030             Time Calculation    Start Time 0944  -CM      PT Received On 12/23/22  -CM      PT Goal Re-Cert Due Date 01/02/23  -CM         Untimed Charges    PT Eval/Re-eval Minutes 53  -CM         Total Minutes    Untimed Charges Total Minutes 53  -CM       Total Minutes 53  -CM            User Key  (r) = Recorded By, (t) = Taken By, (c) = Cosigned By    Initials Name Provider Type    CM Bonnie Bustamante, PT Physical Therapist              Therapy Charges for Today     Code Description Service Date Service Provider Modifiers Qty    92726229458 HC PT EVAL LOW COMPLEXITY 4 12/23/2022 Bonnie Bustamante, PT GP 1          PT G-Codes  Outcome Measure Options: AM-PAC 6 Clicks Basic Mobility (PT)  AM-PAC 6 Clicks Score (PT): 20  PT Discharge Summary  Anticipated Discharge Disposition (PT): home with assist    Bonnie Bustamante, PT  12/23/2022

## 2022-12-23 NOTE — PLAN OF CARE
Goal Outcome Evaluation:  Plan of Care Reviewed With: patient           Outcome Evaluation: Pt a/o, VSS, 2 LNC,  Sat. 92%, c/o pain addressed with PRN meds. Continue monitoring.

## 2022-12-23 NOTE — PROGRESS NOTES
[facility] Medicine Services  PROGRESS NOTE    Patient Name: Yarelis Barragan  : 1940  MRN: [id number]    Date of Admission: 2022  Primary Care Physician: Carlos Shah MD    Subjective   Subjective     CC:  F/u sore throat     HPI:  Pt reports that she feels slightly better. Her sore throat has improved enough to talk some     ROS:  Gen- No fevers, chills  CV- No chest pain, palpitations  Resp- + cough, dyspnea  GI- No N/V/D, abd pain        Objective   Objective     Vital Signs:   Temp:  [97.9 °F (36.6 °C)-98.7 °F (37.1 °C)] 97.9 °F (36.6 °C)  Heart Rate:  [63-99] 63  Resp:  [16-18] 16  BP: (112-139)/(57-80) 123/68  Flow (L/min):  [2] 2     Physical Exam:  Constitutional: No acute distress, awake, alert  HENT: NCAT, mucous membranes moist  Respiratory: Clear to auscultation bilaterally, respiratory effort normal on 2L NC  Cardiovascular: RRR, no murmurs, rubs, or gallops  Gastrointestinal: Positive bowel sounds, soft, nontender, nondistended  Musculoskeletal: No bilateral ankle edema  Psychiatric: Appropriate affect, cooperative  Neurologic: Oriented x 3, speech clear  Skin: No rashes      Results Reviewed:  LAB RESULTS:      Lab 22  0430 22  0807 22  0747 22  0732   WBC 6.78  --   --  4.95   HEMOGLOBIN 12.9  --   --  13.5   HEMATOCRIT 39.5  --   --  40.1   PLATELETS 207  --   --  191   NEUTROS ABS  --   --   --  3.08   IMMATURE GRANS (ABS)  --   --   --  0.01   LYMPHS ABS  --   --   --  1.07   MONOS ABS  --   --   --  0.72   EOS ABS  --   --   --  0.05   MCV 91.9  --   --  90.9   SED RATE  --   --   --  14   CRP 1.77* <0.30  --   --    PROCALCITONIN  --   --  0.06  --    LACTATE  --   --   --  0.8         Lab 22  0430 22  0747   SODIUM 138 141   POTASSIUM 4.4 4.2   CHLORIDE 106 109*   CO2 18.0* 20.0*   ANION GAP 14.0 12.0   BUN 14 14   CREATININE 0.74 0.77   EGFR 81.4 77.6   GLUCOSE 145* 104*   CALCIUM 8.4* 8.9   MAGNESIUM 2.4  1.8         Lab 12/23/22  0430 12/22/22  0747   TOTAL PROTEIN 6.1 6.2   ALBUMIN 4.10 4.00   GLOBULIN 2.0 2.2   ALT (SGPT) 14 15   AST (SGOT) 19 21   BILIRUBIN 0.3 0.4   ALK PHOS 66 70         Lab 12/22/22  0807 12/22/22  0747   PROBNP  --  166.1   TROPONIN T <0.010  --                  Brief Urine Lab Results  (Last result in the past 365 days)      Color   Clarity   Blood   Leuk Est   Nitrite   Protein   CREAT   Urine HCG        12/22/22 0733 Yellow   Clear   Negative   Negative   Negative   Negative                 Microbiology Results Abnormal     Procedure Component Value - Date/Time    Blood Culture - Blood, Arm, Right [[id number]]  (Normal) Collected: 12/22/22 0812    Lab Status: Preliminary result Specimen: Blood from Arm, Right Updated: 12/23/22 0845     Blood Culture No growth at 24 hours    Blood Culture - Blood, Arm, Left [[id number]]  (Normal) Collected: 12/22/22 0820    Lab Status: Preliminary result Specimen: Blood from Arm, Left Updated: 12/23/22 0845     Blood Culture No growth at 24 hours    Rapid Strep A Screen - Swab, Throat [[id number]]  (Normal) Collected: 12/22/22 0714    Lab Status: Final result Specimen: Swab from Throat Updated: 12/22/22 0739     Strep A Ag Negative    Narrative:      Test performed by Direct Antigen Testing.          CT Head Without Contrast    Result Date: 12/22/2022  CT HEAD WO CONTRAST-  Date of Exam: 12/22/2022 2:03 PM  Indication: abnormal findings on CT today; subdural hematoma.  Comparison: 12/22/2022 CT soft tissue neck earlier same day  Technique:  Without contrast, contiguous axial CT images of the head were obtained from skull base to vertex.  Coronal and sagittal reconstructions were performed.  Automated exposure control and iterative reconstruction methods were used.  FINDINGS PET/CT demonstrate symmetric widening of the anterior CSF spaces bilaterally. This is likely due to atrophy rather than a chronic subdural hematoma or hygroma. The previous study of the soft  tissue neck was performed with the head somewhat angled in the gantry, which cause this to look asymmetric. There is mild periventricular white matter hypodensity most commonly secondary to chronic small vessel ischemic change. Medial basal ganglia calcifications are noted. There is no evidence of mass effect, midline shift. Evaluation for hemorrhage is limited due to persistent contrast opacification within the vasculature from earlier prior study. No pathologic parenchymal enhancement is identified. There is normal gray-white differentiation. Atherosclerotic vascular calcification is noted. Orbits are unremarkable for age. Visualized paranasal sinuses and mastoid air cells are clear. No acute calvarial defects are seen.      Impression:  1. Frontal parenchymal volume loss resulting in widening of the anterior CSF spaces bilaterally. Earlier soft tissue neck CT was performed with the head angled in the gantry which gave an asymmetric appearance to the anterior CSF spaces. No evidence of subdural hematoma or hygroma on the current study. 2. Parenchymal volume loss and probable chronic small vessel ischemic change. Brain MRI is more sensitive to evaluate for acute or subacute infarcts and to evaluate for intracranial metastatic disease.  This report was finalized on 12/22/2022 2:22 PM by Osiel Carson MD.      CT Soft Tissue Neck With Contrast    Result Date: 12/22/2022  CT SOFT TISSUE NECK W CONTRAST-  Date of Exam: 12/22/2022 10:58 AM  Indication: foreign body sensation in painful throat  Comparison: None available.  Technique: Contiguous axial imaging of the neck was performed after uneventful administration of 100 cc Isovue-370 .  Coronal and sagittal reconstructions were performed.  Automated exposure control and iterative reconstruction methods were used.  FINDINGS: There is a small fluid density extra-axial collection at the left frontal region measuring 7 mm. This is suggestive of a chronic subdural hematoma  or hygroma. Visualized basilar portions of brain and skull base are otherwise grossly unremarkable. There is no pathologic enhancement. Visualized paranasal sinuses and mastoid air cells are clear. Visualized orbits are unremarkable for age. Parotid and submandibular glands appears symmetric. There is a 9 mm indeterminate hypodense lesion in the inferior right thyroid lobe. No cervical adenopathy is identified. Parapharyngeal soft tissues are unremarkable. The epiglottis and periepiglottic tissues appear symmetric and unremarkable. No abnormal fluid collections or inflammatory changes.  Atherosclerotic vascular calcification is present. There is degenerative change in cervical spine. No aggressive osseous lesions are identified.      Impression:  1. No acute soft tissue abnormality identified in the neck. 2. 7 mm fluid density extra-axial collection partially included over the left frontal region suggesting a chronic subdural hematoma or hygroma. 3. 9 mm indeterminate right thyroid nodule.    This report was finalized on 12/22/2022 12:59 PM by Osiel Carson MD.      CT Angiogram Chest    Result Date: 12/22/2022  CT ANGIOGRAM CHEST-  Date of Exam: 12/22/2022 10:58 AM  Indication: dyspnea; covid.  Comparison: None available.  Technique: Contiguous axial CTA imaging of the chest was obtained following the uneventful intravenous administration of 100 cc Isovue-370 contrast. Reconstructions in the coronal and sagittal planes were also performed. 3-D reconstructed images were also created and reviewed. Automated exposure control and iterative reconstruction methods were used.  FINDINGS: There are no pulmonary arterial filling defects evident to suggest PE. Pulmonary arteries appear grossly normal caliber. Thoracic aorta is normal caliber. There is mild scarring in the lung apices. There is patchy groundglass opacity in the lung bases bilaterally which may be due to atypical infiltrate or dependent atelectasis. No pleural  fluid is seen. No pulmonary nodules or masses are identified. Central airways are grossly patent. The stomach or hilar adenopathy is identified. No axillary or supraclavicular adenopathy is identified. Limited imaging through the upper abdomen demonstrates a grossly normal adrenal morphology. There is a small hiatal hernia. Small amount of pericardial fluid is noted. There are mild degenerative changes in the spine. No aggressive osseous lesions are identified.      Impression:  1. No evidence of pulmonary embolus. 2. Nonspecific groundglass opacities in the bases may be due to early atypical infiltrate or dependent atelectasis.  This report was finalized on 12/22/2022 12:45 PM by Osiel Carson MD.            I have reviewed the medications:  Scheduled Meds:dexamethasone, 6 mg, Intravenous, Daily  enoxaparin, 40 mg, Subcutaneous, Nightly  remdesivir, 100 mg, Intravenous, Daily With Lunch  sennosides, 10 mL, Oral, BID  sodium chloride, 10 mL, Intravenous, Q12H      Continuous Infusions:Pharmacy Consult - Remdesivir,       PRN Meds:.•  acetaminophen  •  albuterol sulfate HFA  •  [DISCONTINUED] senna-docusate sodium **AND** polyethylene glycol **AND** bisacodyl **AND** bisacodyl  •  guaiFENesin-dextromethorphan  •  magnesium sulfate **OR** magnesium sulfate in D5W 1g/100mL (PREMIX) **OR** magnesium sulfate  •  ondansetron  •  Pharmacy Consult - Remdesivir  •  phenol  •  sodium chloride  •  sodium chloride  •  sodium chloride  •  traMADol    Assessment & Plan   Assessment & Plan     Active Hospital Problems    Diagnosis  POA   • **COVID-19 [U07.1]  Yes   • Bronchitis [J40]  Unknown   • Hypoxia [R09.02]  Unknown   • Sore throat [J02.9]  Unknown   • Fatigue [R53.83]  Unknown      Resolved Hospital Problems   No resolved problems to display.        Brief Hospital Course to date:  Yarelis Barragan is a 82 y.o. female with no significant PMH that lives at home with her  admitted with COVID. Has developed severe  dysphagia and had limted po intake.     Acute COVID  Pharyngitis  -tested + on 12/21  -CTA negaitve for PE   -CT neck shows thyroid nodule, needs outpatient follow up   -remdesivir and decadron   -prn meds       Expected Discharge Location and Transportation: home   Expected Discharge 12/26       DVT prophylaxis:  Medical DVT prophylaxis orders are present.          CODE STATUS:   Code Status and Medical Interventions:   Ordered at: 12/22/22 1537     Medical Intervention Limits:    NO intubation (DNI)     Code Status (Patient has no pulse and is not breathing):    No CPR (Do Not Attempt to Resuscitate)     Medical Interventions (Patient has pulse or is breathing):    Limited Support       Kalpana Hernandez, DO  12/23/22

## 2022-12-24 LAB
ALBUMIN SERPL-MCNC: 3.5 G/DL (ref 3.5–5.2)
ALBUMIN/GLOB SERPL: 1.4 G/DL
ALP SERPL-CCNC: 55 U/L (ref 39–117)
ALT SERPL W P-5'-P-CCNC: 15 U/L (ref 1–33)
ANION GAP SERPL CALCULATED.3IONS-SCNC: 9 MMOL/L (ref 5–15)
AST SERPL-CCNC: 18 U/L (ref 1–32)
BACTERIA SPEC AEROBE CULT: NORMAL
BILIRUB SERPL-MCNC: 0.4 MG/DL (ref 0–1.2)
BUN SERPL-MCNC: 16 MG/DL (ref 8–23)
BUN/CREAT SERPL: 21.3 (ref 7–25)
CALCIUM SPEC-SCNC: 8.2 MG/DL (ref 8.6–10.5)
CHLORIDE SERPL-SCNC: 107 MMOL/L (ref 98–107)
CO2 SERPL-SCNC: 23 MMOL/L (ref 22–29)
CREAT SERPL-MCNC: 0.75 MG/DL (ref 0.57–1)
DEPRECATED RDW RBC AUTO: 40.2 FL (ref 37–54)
EGFRCR SERPLBLD CKD-EPI 2021: 80.1 ML/MIN/1.73
ERYTHROCYTE [DISTWIDTH] IN BLOOD BY AUTOMATED COUNT: 12 % (ref 12.3–15.4)
GLOBULIN UR ELPH-MCNC: 2.5 GM/DL
GLUCOSE SERPL-MCNC: 132 MG/DL (ref 65–99)
HCT VFR BLD AUTO: 38.4 % (ref 34–46.6)
HGB BLD-MCNC: 12.6 G/DL (ref 12–15.9)
MCH RBC QN AUTO: 29.9 PG (ref 26.6–33)
MCHC RBC AUTO-ENTMCNC: 32.8 G/DL (ref 31.5–35.7)
MCV RBC AUTO: 91 FL (ref 79–97)
PLATELET # BLD AUTO: 200 10*3/MM3 (ref 140–450)
PMV BLD AUTO: 9.8 FL (ref 6–12)
POTASSIUM SERPL-SCNC: 3.6 MMOL/L (ref 3.5–5.2)
PROT SERPL-MCNC: 6 G/DL (ref 6–8.5)
RBC # BLD AUTO: 4.22 10*6/MM3 (ref 3.77–5.28)
SODIUM SERPL-SCNC: 139 MMOL/L (ref 136–145)
WBC NRBC COR # BLD: 6.25 10*3/MM3 (ref 3.4–10.8)

## 2022-12-24 PROCEDURE — 99231 SBSQ HOSP IP/OBS SF/LOW 25: CPT | Performed by: FAMILY MEDICINE

## 2022-12-24 PROCEDURE — 25010000002 ENOXAPARIN PER 10 MG: Performed by: INTERNAL MEDICINE

## 2022-12-24 PROCEDURE — 85027 COMPLETE CBC AUTOMATED: CPT | Performed by: INTERNAL MEDICINE

## 2022-12-24 PROCEDURE — 25010000002 REMDESIVIR 100 MG/20ML SOLUTION 1 EACH VIAL: Performed by: INTERNAL MEDICINE

## 2022-12-24 PROCEDURE — 25010000002 DEXAMETHASONE PER 1 MG: Performed by: INTERNAL MEDICINE

## 2022-12-24 PROCEDURE — 97530 THERAPEUTIC ACTIVITIES: CPT

## 2022-12-24 PROCEDURE — 80053 COMPREHEN METABOLIC PANEL: CPT | Performed by: INTERNAL MEDICINE

## 2022-12-24 PROCEDURE — 97116 GAIT TRAINING THERAPY: CPT

## 2022-12-24 RX ADMIN — Medication 10 ML: at 09:20

## 2022-12-24 RX ADMIN — DEXAMETHASONE SODIUM PHOSPHATE 6 MG: 4 INJECTION INTRA-ARTICULAR; INTRALESIONAL; INTRAMUSCULAR; INTRAVENOUS; SOFT TISSUE at 09:20

## 2022-12-24 RX ADMIN — Medication 10 ML: at 21:20

## 2022-12-24 RX ADMIN — ENOXAPARIN SODIUM 40 MG: 40 INJECTION SUBCUTANEOUS at 21:20

## 2022-12-24 RX ADMIN — REMDESIVIR 100 MG: 100 INJECTION, POWDER, LYOPHILIZED, FOR SOLUTION INTRAVENOUS at 12:45

## 2022-12-24 NOTE — PLAN OF CARE
Goal Outcome Evaluation:  Plan of Care Reviewed With: patient           Outcome Evaluation: Pt a/o, VSS, 2 LNC,  Sat. 94%, c/o pain addressed with PRN meds. Continue monitoring.

## 2022-12-24 NOTE — THERAPY TREATMENT NOTE
Patient Name: Yarelis Barragan  : 1940    MRN: [id number]                              Today's Date: 2022       Admit Date: 2022    Visit Dx:     ICD-10-CM ICD-9-CM   1. COVID-19  U07.1 079.89   2. Hypoxia  R09.02 799.02   3. Sore throat  J02.9 462     Patient Active Problem List   Diagnosis   • COVID-19   • Bronchitis   • Hypoxia   • Sore throat   • Fatigue     History reviewed. No pertinent past medical history.  Past Surgical History:   Procedure Laterality Date   • HYSTERECTOMY      AGE 52   • OOPHORECTOMY Bilateral     AGE 52      General Information     Row Name 22 1509          Physical Therapy Time and Intention    Document Type therapy note (daily note)  -ES     Mode of Treatment physical therapy  -ES     Row Name 22 1509          General Information    Patient Profile Reviewed yes  -ES     Existing Precautions/Restrictions fall  -ES     Barriers to Rehab none identified  -ES     Row Name 22 1509          Cognition    Orientation Status (Cognition) oriented x 4  -ES     Row Name 22 1509          Safety Issues, Functional Mobility    Safety Issues Affecting Function (Mobility) safety precaution awareness;insight into deficits/self-awareness  -ES     Impairments Affecting Function (Mobility) shortness of breath;endurance/activity tolerance  -ES           User Key  (r) = Recorded By, (t) = Taken By, (c) = Cosigned By    Initials Name Provider Type    ES Esperanza Martinez PT Physical Therapist               Mobility     Row Name 22 1509          Bed Mobility    Bed Mobility supine-sit  -ES     Supine-Sit [place] (Bed Mobility) standby assist;verbal cues  -ES     Assistive Device (Bed Mobility) head of bed elevated;bed rails  -ES     Comment, (Bed Mobility) v/c for safety awareness  -ES     Row Name 22 1509          Sit-Stand Transfer    Sit-Stand [place] (Transfers) standby assist  -ES     Row Name 22 1509          Gait/Stairs  (Locomotion)    [place] Level (Gait) contact guard  -ES     Distance in Feet (Gait) 100  -ES     Deviations/Abnormal Patterns (Gait) gait speed decreased;stride length decreased;weight shifting decreased  -ES     Bilateral Gait Deviations decreased arm swing  -ES     Comment, (Gait/Stairs) Pt amb 100' within room on RA with CGA and no AD. Pt initially unsteady but no LOB, VSS. Demo'd step-through gait pattern and decreased B weight shifting. SpO2 >90% on RA throughout session.  -ES           User Key  (r) = Recorded By, (t) = Taken By, (c) = Cosigned By    Initials Name Provider Type    Esperanza Monteiro PT Physical Therapist               Obj/Interventions     Row Name 12/24/22 1511          Balance    Balance Assessment sitting static balance;sitting dynamic balance;standing static balance;standing dynamic balance  -ES     Static Sitting Balance independent  -ES     Dynamic Sitting Balance standby assist  -ES     Position, Sitting Balance unsupported;sitting edge of bed;other (see comments)  commode  -ES     Static Standing Balance standby assist  -ES     Dynamic Standing Balance contact guard  -ES     Position/Device Used, Standing Balance unsupported  -ES     Balance Interventions sitting;standing;sit to stand;supported;static;dynamic;occupation based/functional task  -ES     Comment, Balance initially mildly unsteady but no LOB  -ES           User Key  (r) = Recorded By, (t) = Taken By, (c) = Cosigned By    Initials Name Provider Type    Esperanza Monteiro, OCTAVIO Physical Therapist               Goals/Plan    No documentation.                Clinical Impression     Row Name 12/24/22 1511          Pain    Pretreatment Pain Rating 0/10 - no pain  -ES     Posttreatment Pain Rating 0/10 - no pain  -ES     Pre/Posttreatment Pain Comment denied pain throughout session  -ES     Pain Intervention(s) Repositioned;Ambulation/increased activity  -ES     Row Name 12/24/22 1511          Plan of Care Review    Plan of  Care Reviewed With patient  -ES     Progress improving  -ES     Outcome Evaluation Pt pleasant and motivated to return to PLOF. Pt demo'd mild unsteadiness upon initial sit>stand but no LOB. Amb 100' on RA with CGA and no AD. SpO2 >90% throughout session on RA. PT rec home with assist upon d/c.  -ES     Row Name 12/24/22 1511          Therapy Assessment/Plan (PT)    Rehab Potential (PT) good, to achieve stated therapy goals  -ES     Criteria for Skilled Interventions Met (PT) yes;meets criteria  -ES     Therapy Frequency (PT) daily  -ES     Row Name 12/24/22 1511          Vital Signs    Pre Systolic BP Rehab --  VSS  -ES     Pre SpO2 (%) 97  -ES     O2 Delivery Pre Treatment nasal cannula  -ES     Intra SpO2 (%) 95  -ES     O2 Delivery Intra Treatment room air  -ES     Post SpO2 (%) 95  -ES     O2 Delivery Post Treatment room air  -ES     Pre Patient Position Supine  -ES     Intra Patient Position Standing  -ES     Post Patient Position Sitting  -ES     Row Name 12/24/22 1511          Positioning and Restraints    Pre-Treatment Position in bed  -ES     Post Treatment Position chair  -ES     In Chair notified nsg;reclined;sitting;call light within reach;encouraged to call for assist;exit alarm on;legs elevated;heels elevated;waffle cushion  -ES           User Key  (r) = Recorded By, (t) = Taken By, (c) = Cosigned By    Initials Name Provider Type    ES Esperanza Martinez, PT Physical Therapist               Outcome Measures     Row Name 12/24/22 1513          How much help from another person do you currently need...    Turning from your back to your side while in flat bed without using bedrails? 4  -ES     Moving from lying on back to sitting on the side of a flat bed without bedrails? 4  -ES     Moving to and from a bed to a chair (including a wheelchair)? 3  -ES     Standing up from a chair using your arms (e.g., wheelchair, bedside chair)? 3  -ES     Climbing 3-5 steps with a railing? 3  -ES     To walk in hospital  room? 3  -ES     AM-PAC 6 Clicks Score (PT) 20  -ES     Highest level of mobility 6 --> Walked 10 steps or more  -ES     Row Name 12/24/22 1513          Functional Assessment    Outcome Measure Options AM-PAC 6 Clicks Basic Mobility (PT)  -ES           User Key  (r) = Recorded By, (t) = Taken By, (c) = Cosigned By    Initials Name Provider Type    ES Esperanza Martinez PT Physical Therapist                             Physical Therapy Education     Title: PT OT SLP Therapies (In Progress)     Topic: Physical Therapy (In Progress)     Point: Mobility training (Done)     Learning Progress Summary           Patient Acceptance, E, VU by CM at 12/23/2022 1029                   Point: Home exercise program (Not Started)     Learner Progress:  Not documented in this visit.          Point: Body mechanics (Not Started)     Learner Progress:  Not documented in this visit.          Point: Precautions (Done)     Learning Progress Summary           Patient Acceptance, E, VU by CM at 12/23/2022 1029                               User Key     Initials Effective Dates Name Provider Type Discipline    CM 09/22/22 -  Bonnie Bustamante PT Physical Therapist PT              PT Recommendation and Plan     Plan of Care Reviewed With: patient  Progress: improving  Outcome Evaluation: Pt pleasant and motivated to return to PLOF. Pt demo'd mild unsteadiness upon initial sit>stand but no LOB. Amb 100' on RA with CGA and no AD. SpO2 >90% throughout session on RA. PT rec home with assist upon d/c.     Time Calculation:    PT Charges     Row Name 12/24/22 1513             Time Calculation    Start Time 1445  -ES      PT Received On 12/24/22  -ES      PT Goal Re-Cert Due Date 01/02/23  -ES         Time Calculation- PT    Total Timed Code Minutes- PT 23 minute(s)  -ES         Timed Charges    27195 - Gait Training Minutes  13  -ES      42358 - PT Therapeutic Activity Minutes 10  -ES         Total Minutes    Timed Charges Total Minutes 23  -ES        Total Minutes 23  -ES            User Key  (r) = Recorded By, (t) = Taken By, (c) = Cosigned By    Initials Name Provider Type    ES Esperanza Martinez PT Physical Therapist              Therapy Charges for Today     Code Description Service Date Service Provider Modifiers Qty    [id number]  GAIT TRAINING EA 15 MIN 12/24/2022 Esperanza Martinez, PT GP 1    [id number]  PT THERAPEUTIC ACT EA 15 MIN 12/24/2022 Esperanza Martinez, PT GP 1          PT G-Codes  Outcome Measure Options: AM-PAC 6 Clicks Basic Mobility (PT)  AM-PAC 6 Clicks Score (PT): 20  AM-PAC 6 Clicks Score (OT): 23       Esperanza Martinez PT  12/24/2022

## 2022-12-24 NOTE — PROGRESS NOTES
[facility] Medicine Services  PROGRESS NOTE    Patient Name: Yarelis Barragan  : 1940  MRN: [id number]    Date of Admission: 2022  Primary Care Physician: Carlos Shah MD    Subjective   Subjective     CC:  F/u sore throat     HPI:  Pt reports feeling better. She states that her throat is better and she is able to speak more.     ROS:  Gen- No fevers, chills  CV- No chest pain, palpitations  Resp- + cough, dyspnea  GI- No N/V/D, abd pain        Objective   Objective     Vital Signs:   Temp:  [97.8 °F (36.6 °C)-98.3 °F (36.8 °C)] 98 °F (36.7 °C)  Heart Rate:  [55-89] 55  Resp:  [16-18] 16  BP: (100-136)/(52-77) 100/52  Flow (L/min):  [2] 2     Physical Exam:  Constitutional: No acute distress, awake, alert  HENT: NCAT, mucous membranes moist  Respiratory: Clear to auscultation bilaterally, respiratory effort normal on 2L NC  Cardiovascular: RRR, no murmurs, rubs, or gallops  Gastrointestinal: Positive bowel sounds, soft, nontender, nondistended  Musculoskeletal: No bilateral ankle edema  Psychiatric: Appropriate affect, cooperative  Neurologic: Oriented x 3, speech clear  Skin: No rashes      Results Reviewed:  LAB RESULTS:      Lab 22  0430 22  0807 22  0747 22  0732   WBC 6.78  --   --  4.95   HEMOGLOBIN 12.9  --   --  13.5   HEMATOCRIT 39.5  --   --  40.1   PLATELETS 207  --   --  191   NEUTROS ABS  --   --   --  3.08   IMMATURE GRANS (ABS)  --   --   --  0.01   LYMPHS ABS  --   --   --  1.07   MONOS ABS  --   --   --  0.72   EOS ABS  --   --   --  0.05   MCV 91.9  --   --  90.9   SED RATE  --   --   --  14   CRP 1.77* <0.30  --   --    PROCALCITONIN  --   --  0.06  --    LACTATE  --   --   --  0.8         Lab 22  0430 22  0747   SODIUM 138 141   POTASSIUM 4.4 4.2   CHLORIDE 106 109*   CO2 18.0* 20.0*   ANION GAP 14.0 12.0   BUN 14 14   CREATININE 0.74 0.77   EGFR 81.4 77.6   GLUCOSE 145* 104*   CALCIUM 8.4* 8.9   MAGNESIUM 2.4  1.8         Lab 12/23/22  0430 12/22/22  0747   TOTAL PROTEIN 6.1 6.2   ALBUMIN 4.10 4.00   GLOBULIN 2.0 2.2   ALT (SGPT) 14 15   AST (SGOT) 19 21   BILIRUBIN 0.3 0.4   ALK PHOS 66 70         Lab 12/22/22  0807 12/22/22  0747   PROBNP  --  166.1   TROPONIN T <0.010  --                  Brief Urine Lab Results  (Last result in the past 365 days)      Color   Clarity   Blood   Leuk Est   Nitrite   Protein   CREAT   Urine HCG        12/22/22 0733 Yellow   Clear   Negative   Negative   Negative   Negative                 Microbiology Results Abnormal     Procedure Component Value - Date/Time    Blood Culture - Blood, Arm, Right [[id number]]  (Normal) Collected: 12/22/22 0812    Lab Status: Preliminary result Specimen: Blood from Arm, Right Updated: 12/24/22 0846     Blood Culture No growth at 2 days    Blood Culture - Blood, Arm, Left [[id number]]  (Normal) Collected: 12/22/22 0820    Lab Status: Preliminary result Specimen: Blood from Arm, Left Updated: 12/24/22 0846     Blood Culture No growth at 2 days    Beta Strep Culture, Throat - Swab, Throat [[id number]]  (Normal) Collected: 12/22/22 0714    Lab Status: Final result Specimen: Swab from Throat Updated: 12/24/22 0747     Throat Culture, Beta Strep No Beta Hemolytic Streptococcus Isolated    Rapid Strep A Screen - Swab, Throat [[id number]]  (Normal) Collected: 12/22/22 0714    Lab Status: Final result Specimen: Swab from Throat Updated: 12/22/22 0739     Strep A Ag Negative    Narrative:      Test performed by Direct Antigen Testing.          CT Head Without Contrast    Result Date: 12/22/2022  CT HEAD WO CONTRAST-  Date of Exam: 12/22/2022 2:03 PM  Indication: abnormal findings on CT today; subdural hematoma.  Comparison: 12/22/2022 CT soft tissue neck earlier same day  Technique:  Without contrast, contiguous axial CT images of the head were obtained from skull base to vertex.  Coronal and sagittal reconstructions were performed.  Automated exposure control and  iterative reconstruction methods were used.  FINDINGS PET/CT demonstrate symmetric widening of the anterior CSF spaces bilaterally. This is likely due to atrophy rather than a chronic subdural hematoma or hygroma. The previous study of the soft tissue neck was performed with the head somewhat angled in the gantry, which cause this to look asymmetric. There is mild periventricular white matter hypodensity most commonly secondary to chronic small vessel ischemic change. Medial basal ganglia calcifications are noted. There is no evidence of mass effect, midline shift. Evaluation for hemorrhage is limited due to persistent contrast opacification within the vasculature from earlier prior study. No pathologic parenchymal enhancement is identified. There is normal gray-white differentiation. Atherosclerotic vascular calcification is noted. Orbits are unremarkable for age. Visualized paranasal sinuses and mastoid air cells are clear. No acute calvarial defects are seen.      Impression:  1. Frontal parenchymal volume loss resulting in widening of the anterior CSF spaces bilaterally. Earlier soft tissue neck CT was performed with the head angled in the gantry which gave an asymmetric appearance to the anterior CSF spaces. No evidence of subdural hematoma or hygroma on the current study. 2. Parenchymal volume loss and probable chronic small vessel ischemic change. Brain MRI is more sensitive to evaluate for acute or subacute infarcts and to evaluate for intracranial metastatic disease.  This report was finalized on 12/22/2022 2:22 PM by Osiel Carson MD.      CT Soft Tissue Neck With Contrast    Result Date: 12/22/2022  CT SOFT TISSUE NECK W CONTRAST-  Date of Exam: 12/22/2022 10:58 AM  Indication: foreign body sensation in painful throat  Comparison: None available.  Technique: Contiguous axial imaging of the neck was performed after uneventful administration of 100 cc Isovue-370 .  Coronal and sagittal reconstructions were  performed.  Automated exposure control and iterative reconstruction methods were used.  FINDINGS: There is a small fluid density extra-axial collection at the left frontal region measuring 7 mm. This is suggestive of a chronic subdural hematoma or hygroma. Visualized basilar portions of brain and skull base are otherwise grossly unremarkable. There is no pathologic enhancement. Visualized paranasal sinuses and mastoid air cells are clear. Visualized orbits are unremarkable for age. Parotid and submandibular glands appears symmetric. There is a 9 mm indeterminate hypodense lesion in the inferior right thyroid lobe. No cervical adenopathy is identified. Parapharyngeal soft tissues are unremarkable. The epiglottis and periepiglottic tissues appear symmetric and unremarkable. No abnormal fluid collections or inflammatory changes.  Atherosclerotic vascular calcification is present. There is degenerative change in cervical spine. No aggressive osseous lesions are identified.      Impression:  1. No acute soft tissue abnormality identified in the neck. 2. 7 mm fluid density extra-axial collection partially included over the left frontal region suggesting a chronic subdural hematoma or hygroma. 3. 9 mm indeterminate right thyroid nodule.    This report was finalized on 12/22/2022 12:59 PM by Osiel Carson MD.      CT Angiogram Chest    Result Date: 12/22/2022  CT ANGIOGRAM CHEST-  Date of Exam: 12/22/2022 10:58 AM  Indication: dyspnea; covid.  Comparison: None available.  Technique: Contiguous axial CTA imaging of the chest was obtained following the uneventful intravenous administration of 100 cc Isovue-370 contrast. Reconstructions in the coronal and sagittal planes were also performed. 3-D reconstructed images were also created and reviewed. Automated exposure control and iterative reconstruction methods were used.  FINDINGS: There are no pulmonary arterial filling defects evident to suggest PE. Pulmonary arteries appear  grossly normal caliber. Thoracic aorta is normal caliber. There is mild scarring in the lung apices. There is patchy groundglass opacity in the lung bases bilaterally which may be due to atypical infiltrate or dependent atelectasis. No pleural fluid is seen. No pulmonary nodules or masses are identified. Central airways are grossly patent. The stomach or hilar adenopathy is identified. No axillary or supraclavicular adenopathy is identified. Limited imaging through the upper abdomen demonstrates a grossly normal adrenal morphology. There is a small hiatal hernia. Small amount of pericardial fluid is noted. There are mild degenerative changes in the spine. No aggressive osseous lesions are identified.      Impression:  1. No evidence of pulmonary embolus. 2. Nonspecific groundglass opacities in the bases may be due to early atypical infiltrate or dependent atelectasis.  This report was finalized on 12/22/2022 12:45 PM by Osiel Carson MD.            I have reviewed the medications:  Scheduled Meds:dexamethasone, 6 mg, Intravenous, Daily  enoxaparin, 40 mg, Subcutaneous, Nightly  remdesivir, 100 mg, Intravenous, Daily With Lunch  sennosides, 10 mL, Oral, BID  sodium chloride, 10 mL, Intravenous, Q12H      Continuous Infusions:Pharmacy Consult - Remdesivir,       PRN Meds:.•  acetaminophen  •  albuterol sulfate HFA  •  [DISCONTINUED] senna-docusate sodium **AND** polyethylene glycol **AND** bisacodyl **AND** bisacodyl  •  guaiFENesin-dextromethorphan  •  magnesium sulfate **OR** magnesium sulfate in D5W 1g/100mL (PREMIX) **OR** magnesium sulfate  •  ondansetron  •  Pharmacy Consult - Remdesivir  •  phenol  •  sodium chloride  •  sodium chloride  •  sodium chloride  •  traMADol    Assessment & Plan   Assessment & Plan     Active Hospital Problems    Diagnosis  POA   • **COVID-19 [U07.1]  Yes   • Bronchitis [J40]  Unknown   • Hypoxia [R09.02]  Unknown   • Sore throat [J02.9]  Unknown   • Fatigue [R53.83]  Unknown       Resolved Hospital Problems   No resolved problems to display.        Brief Hospital Course to date:  Yarelis Barragan is a 82 y.o. female with no significant PMH that lives at home with her  admitted with COVID. Has developed severe dysphagia and had limted po intake.     Acute COVID  Pharyngitis  -tested + on 12/21  -CTA negaitve for PE   -CT neck shows thyroid nodule, needs outpatient follow up   -remdesivir and decadron   -prn meds       Expected Discharge Location and Transportation: home   Expected Discharge 12/26  Expected Discharge Date and Time     Expected Discharge Date Expected Discharge Time    Dec 26, 2022            DVT prophylaxis:  Medical DVT prophylaxis orders are present.     AM-PAC 6 Clicks Score (PT): 20 (12/23/22 1029)    CODE STATUS:   Code Status and Medical Interventions:   Ordered at: 12/22/22 5865     Medical Intervention Limits:    NO intubation (DNI)     Code Status (Patient has no pulse and is not breathing):    No CPR (Do Not Attempt to Resuscitate)     Medical Interventions (Patient has pulse or is breathing):    Limited Support       Kalpana Hernandez, DO  12/24/22

## 2022-12-24 NOTE — PLAN OF CARE
Goal Outcome Evaluation:  Plan of Care Reviewed With: patient        Progress: improving  Outcome Evaluation: Pt pleasant and motivated to return to PLOF. Pt demo'd mild unsteadiness upon initial sit>stand but no LOB. Amb 100' on RA with CGA and no AD. SpO2 >90% throughout session on RA. PT rec home with assist upon d/c.

## 2022-12-25 LAB
ALBUMIN SERPL-MCNC: 3.8 G/DL (ref 3.5–5.2)
ALBUMIN/GLOB SERPL: 1.8 G/DL
ALP SERPL-CCNC: 61 U/L (ref 39–117)
ALT SERPL W P-5'-P-CCNC: 16 U/L (ref 1–33)
ANION GAP SERPL CALCULATED.3IONS-SCNC: 7 MMOL/L (ref 5–15)
AST SERPL-CCNC: 17 U/L (ref 1–32)
BILIRUB SERPL-MCNC: 0.3 MG/DL (ref 0–1.2)
BUN SERPL-MCNC: 21 MG/DL (ref 8–23)
BUN/CREAT SERPL: 24.4 (ref 7–25)
CALCIUM SPEC-SCNC: 9 MG/DL (ref 8.6–10.5)
CHLORIDE SERPL-SCNC: 110 MMOL/L (ref 98–107)
CO2 SERPL-SCNC: 26 MMOL/L (ref 22–29)
CREAT SERPL-MCNC: 0.86 MG/DL (ref 0.57–1)
DEPRECATED RDW RBC AUTO: 38.5 FL (ref 37–54)
EGFRCR SERPLBLD CKD-EPI 2021: 68 ML/MIN/1.73
ERYTHROCYTE [DISTWIDTH] IN BLOOD BY AUTOMATED COUNT: 11.9 % (ref 12.3–15.4)
GLOBULIN UR ELPH-MCNC: 2.1 GM/DL
GLUCOSE SERPL-MCNC: 100 MG/DL (ref 65–99)
HCT VFR BLD AUTO: 37.9 % (ref 34–46.6)
HGB BLD-MCNC: 12.7 G/DL (ref 12–15.9)
MCH RBC QN AUTO: 30 PG (ref 26.6–33)
MCHC RBC AUTO-ENTMCNC: 33.5 G/DL (ref 31.5–35.7)
MCV RBC AUTO: 89.6 FL (ref 79–97)
PLATELET # BLD AUTO: 235 10*3/MM3 (ref 140–450)
PMV BLD AUTO: 9.7 FL (ref 6–12)
POTASSIUM SERPL-SCNC: 4.9 MMOL/L (ref 3.5–5.2)
PROT SERPL-MCNC: 5.9 G/DL (ref 6–8.5)
RBC # BLD AUTO: 4.23 10*6/MM3 (ref 3.77–5.28)
SODIUM SERPL-SCNC: 143 MMOL/L (ref 136–145)
WBC NRBC COR # BLD: 6.6 10*3/MM3 (ref 3.4–10.8)

## 2022-12-25 PROCEDURE — 25010000002 ENOXAPARIN PER 10 MG: Performed by: INTERNAL MEDICINE

## 2022-12-25 PROCEDURE — 80053 COMPREHEN METABOLIC PANEL: CPT | Performed by: INTERNAL MEDICINE

## 2022-12-25 PROCEDURE — 25010000002 DEXAMETHASONE PER 1 MG: Performed by: INTERNAL MEDICINE

## 2022-12-25 PROCEDURE — 25010000002 REMDESIVIR 100 MG/20ML SOLUTION 1 EACH VIAL: Performed by: INTERNAL MEDICINE

## 2022-12-25 PROCEDURE — 99231 SBSQ HOSP IP/OBS SF/LOW 25: CPT | Performed by: FAMILY MEDICINE

## 2022-12-25 PROCEDURE — 85027 COMPLETE CBC AUTOMATED: CPT | Performed by: INTERNAL MEDICINE

## 2022-12-25 RX ADMIN — DEXAMETHASONE SODIUM PHOSPHATE 6 MG: 4 INJECTION INTRA-ARTICULAR; INTRALESIONAL; INTRAMUSCULAR; INTRAVENOUS; SOFT TISSUE at 09:17

## 2022-12-25 RX ADMIN — GUAIFENESIN AND DEXTROMETHORPHAN 5 ML: 100; 10 SYRUP ORAL at 20:11

## 2022-12-25 RX ADMIN — ENOXAPARIN SODIUM 40 MG: 40 INJECTION SUBCUTANEOUS at 20:11

## 2022-12-25 RX ADMIN — Medication 10 ML: at 09:18

## 2022-12-25 RX ADMIN — REMDESIVIR 100 MG: 100 INJECTION, POWDER, LYOPHILIZED, FOR SOLUTION INTRAVENOUS at 13:12

## 2022-12-25 NOTE — PLAN OF CARE
Goal Outcome Evaluation:  Plan of Care Reviewed With: patient   Pt rested well through the night, VS stable, on RA. Did not require any pain medicine.      Progress: improving

## 2022-12-25 NOTE — PLAN OF CARE
Goal Outcome Evaluation:  Plan of Care Reviewed With: patient           Outcome Evaluation: Pt a/o, VSS, RA,  Sat. 94%, c/o pain addressed with PRN meds. Continue monitoring.

## 2022-12-25 NOTE — PROGRESS NOTES
[facility] Medicine Services  PROGRESS NOTE    Patient Name: Yarelis Barragan  : 1940  MRN: [id number]    Date of Admission: 2022  Primary Care Physician: Carlos Shah MD    Subjective   Subjective     CC:  F/u sore throat     HPI:  Pt feels much better. Mild sore throat.      ROS:  Gen- No fevers, chills  CV- No chest pain, palpitations  Resp- + cough, dyspnea  GI- No N/V/D, abd pain        Objective   Objective     Vital Signs:   Temp:  [97.8 °F (36.6 °C)-98.1 °F (36.7 °C)] 98 °F (36.7 °C)  Heart Rate:  [62-81] 81  Resp:  [16-18] 16  BP: (110-135)/(62-73) 134/73  Flow (L/min):  [2] 2     Physical Exam:  Constitutional: No acute distress, awake, alert, sitting up in bed   HENT: NCAT, mucous membranes moist  Respiratory: Clear to auscultation bilaterally, respiratory effort normal on RA  Cardiovascular: RRR, no murmurs, rubs, or gallops  Gastrointestinal: Positive bowel sounds, soft, nontender, nondistended  Musculoskeletal: No bilateral ankle edema  Psychiatric: Appropriate affect, cooperative  Neurologic: Oriented x 3, speech clear  Skin: No rashes      Results Reviewed:  LAB RESULTS:      Lab 22  0517 22  0901 22  0430 22  0807 22  0747 22  0732   WBC 6.60 6.25 6.78  --   --  4.95   HEMOGLOBIN 12.7 12.6 12.9  --   --  13.5   HEMATOCRIT 37.9 38.4 39.5  --   --  40.1   PLATELETS 235 200 207  --   --  191   NEUTROS ABS  --   --   --   --   --  3.08   IMMATURE GRANS (ABS)  --   --   --   --   --  0.01   LYMPHS ABS  --   --   --   --   --  1.07   MONOS ABS  --   --   --   --   --  0.72   EOS ABS  --   --   --   --   --  0.05   MCV 89.6 91.0 91.9  --   --  90.9   SED RATE  --   --   --   --   --  14   CRP  --   --  1.77* <0.30  --   --    PROCALCITONIN  --   --   --   --  0.06  --    LACTATE  --   --   --   --   --  0.8         Lab 22  0517 22  0901 22  0430 22  0747   SODIUM 143 139 138 141   POTASSIUM 4.9 3.6  4.4 4.2   CHLORIDE 110* 107 106 109*   CO2 26.0 23.0 18.0* 20.0*   ANION GAP 7.0 9.0 14.0 12.0   BUN 21 16 14 14   CREATININE 0.86 0.75 0.74 0.77   EGFR 68.0 80.1 81.4 77.6   GLUCOSE 100* 132* 145* 104*   CALCIUM 9.0 8.2* 8.4* 8.9   MAGNESIUM  --   --  2.4 1.8         Lab 12/25/22  0517 12/24/22  0901 12/23/22  0430 12/22/22  0747   TOTAL PROTEIN 5.9* 6.0 6.1 6.2   ALBUMIN 3.80 3.50 4.10 4.00   GLOBULIN 2.1 2.5 2.0 2.2   ALT (SGPT) 16 15 14 15   AST (SGOT) 17 18 19 21   BILIRUBIN 0.3 0.4 0.3 0.4   ALK PHOS 61 55 66 70         Lab 12/22/22  0807 12/22/22 0747   PROBNP  --  166.1   TROPONIN T <0.010  --                  Brief Urine Lab Results  (Last result in the past 365 days)      Color   Clarity   Blood   Leuk Est   Nitrite   Protein   CREAT   Urine HCG        12/22/22 0733 Yellow   Clear   Negative   Negative   Negative   Negative                 Microbiology Results Abnormal     Procedure Component Value - Date/Time    Blood Culture - Blood, Arm, Right [[id number]]  (Normal) Collected: 12/22/22 0812    Lab Status: Preliminary result Specimen: Blood from Arm, Right Updated: 12/24/22 0846     Blood Culture No growth at 2 days    Blood Culture - Blood, Arm, Left [[id number]]  (Normal) Collected: 12/22/22 0820    Lab Status: Preliminary result Specimen: Blood from Arm, Left Updated: 12/24/22 0846     Blood Culture No growth at 2 days    Beta Strep Culture, Throat - Swab, Throat [[id number]]  (Normal) Collected: 12/22/22 0714    Lab Status: Final result Specimen: Swab from Throat Updated: 12/24/22 0747     Throat Culture, Beta Strep No Beta Hemolytic Streptococcus Isolated    Rapid Strep A Screen - Swab, Throat [[id number]]  (Normal) Collected: 12/22/22 0714    Lab Status: Final result Specimen: Swab from Throat Updated: 12/22/22 0739     Strep A Ag Negative    Narrative:      Test performed by Direct Antigen Testing.          No radiology results from the last 24 hrs        I have reviewed the medications:  Scheduled  Meds:dexamethasone, 6 mg, Intravenous, Daily  enoxaparin, 40 mg, Subcutaneous, Nightly  remdesivir, 100 mg, Intravenous, Daily With Lunch  sennosides, 10 mL, Oral, BID  sodium chloride, 10 mL, Intravenous, Q12H      Continuous Infusions:Pharmacy Consult - Remdesivir,       PRN Meds:.•  acetaminophen  •  albuterol sulfate HFA  •  [DISCONTINUED] senna-docusate sodium **AND** polyethylene glycol **AND** bisacodyl **AND** bisacodyl  •  guaiFENesin-dextromethorphan  •  magnesium sulfate **OR** magnesium sulfate in D5W 1g/100mL (PREMIX) **OR** magnesium sulfate  •  ondansetron  •  Pharmacy Consult - Remdesivir  •  phenol  •  sodium chloride  •  sodium chloride  •  sodium chloride  •  traMADol    Assessment & Plan   Assessment & Plan     Active Hospital Problems    Diagnosis  POA   • **COVID-19 [U07.1]  Yes   • Bronchitis [J40]  Unknown   • Hypoxia [R09.02]  Unknown   • Sore throat [J02.9]  Unknown   • Fatigue [R53.83]  Unknown      Resolved Hospital Problems   No resolved problems to display.        Brief Hospital Course to date:  Yarelis Barragan is a 82 y.o. female with no significant PMH that lives at home with her  admitted with COVID. Has developed severe dysphagia and had limted po intake.     Acute COVID  Pharyngitis  -tested + on 12/21  -CTA negaitve for PE   -Has been on 2L [place], now room air   -CT neck shows thyroid nodule, needs outpatient follow up   -remdesivir day 4/5 and decadron   -prn meds       Expected Discharge Location and Transportation: home   Expected Discharge 12/26  Expected Discharge Date and Time     Expected Discharge Date Expected Discharge Time    Dec 26, 2022            DVT prophylaxis:  Medical DVT prophylaxis orders are present.     AM-PAC 6 Clicks Score (PT): 20 (12/24/22 7287)    CODE STATUS:   Code Status and Medical Interventions:   Ordered at: 12/22/22 1887     Medical Intervention Limits:    NO intubation (DNI)     Code Status (Patient has no pulse and is not breathing):     No CPR (Do Not Attempt to Resuscitate)     Medical Interventions (Patient has pulse or is breathing):    Limited Support       Kalpana Hernandez, DO  12/25/22

## 2022-12-26 ENCOUNTER — READMISSION MANAGEMENT (OUTPATIENT)
Dept: CALL CENTER | Facility: HOSPITAL | Age: 82
End: 2022-12-26

## 2022-12-26 VITALS
HEIGHT: 66 IN | RESPIRATION RATE: 16 BRPM | BODY MASS INDEX: 26.89 KG/M2 | DIASTOLIC BLOOD PRESSURE: 75 MMHG | WEIGHT: 167.3 LBS | OXYGEN SATURATION: 91 % | SYSTOLIC BLOOD PRESSURE: 126 MMHG | TEMPERATURE: 97.7 F | HEART RATE: 59 BPM

## 2022-12-26 PROBLEM — D89.832 CYTOKINE RELEASE SYNDROME, GRADE 2: Status: ACTIVE | Noted: 2022-12-26

## 2022-12-26 PROBLEM — R09.02 HYPOXIA: Status: RESOLVED | Noted: 2022-12-22 | Resolved: 2022-12-26

## 2022-12-26 PROBLEM — J40 BRONCHITIS: Status: RESOLVED | Noted: 2022-12-22 | Resolved: 2022-12-26

## 2022-12-26 PROBLEM — E04.1 THYROID NODULE: Status: ACTIVE | Noted: 2022-12-26

## 2022-12-26 PROBLEM — J02.9 PHARYNGITIS: Status: RESOLVED | Noted: 2022-12-22 | Resolved: 2022-12-26

## 2022-12-26 LAB
ALBUMIN SERPL-MCNC: 3.6 G/DL (ref 3.5–5.2)
ALBUMIN/GLOB SERPL: 1.6 G/DL
ALP SERPL-CCNC: 66 U/L (ref 39–117)
ALT SERPL W P-5'-P-CCNC: 19 U/L (ref 1–33)
ANION GAP SERPL CALCULATED.3IONS-SCNC: 7 MMOL/L (ref 5–15)
AST SERPL-CCNC: 18 U/L (ref 1–32)
BILIRUB SERPL-MCNC: 0.2 MG/DL (ref 0–1.2)
BUN SERPL-MCNC: 21 MG/DL (ref 8–23)
BUN/CREAT SERPL: 26.9 (ref 7–25)
CALCIUM SPEC-SCNC: 9 MG/DL (ref 8.6–10.5)
CHLORIDE SERPL-SCNC: 110 MMOL/L (ref 98–107)
CO2 SERPL-SCNC: 24 MMOL/L (ref 22–29)
CREAT SERPL-MCNC: 0.78 MG/DL (ref 0.57–1)
DEPRECATED RDW RBC AUTO: 39.8 FL (ref 37–54)
EGFRCR SERPLBLD CKD-EPI 2021: 76.4 ML/MIN/1.73
ERYTHROCYTE [DISTWIDTH] IN BLOOD BY AUTOMATED COUNT: 11.7 % (ref 12.3–15.4)
GLOBULIN UR ELPH-MCNC: 2.3 GM/DL
GLUCOSE SERPL-MCNC: 106 MG/DL (ref 65–99)
HCT VFR BLD AUTO: 40.3 % (ref 34–46.6)
HGB BLD-MCNC: 13 G/DL (ref 12–15.9)
MCH RBC QN AUTO: 29.5 PG (ref 26.6–33)
MCHC RBC AUTO-ENTMCNC: 32.3 G/DL (ref 31.5–35.7)
MCV RBC AUTO: 91.6 FL (ref 79–97)
PLATELET # BLD AUTO: 254 10*3/MM3 (ref 140–450)
PMV BLD AUTO: 9.9 FL (ref 6–12)
POTASSIUM SERPL-SCNC: 4.9 MMOL/L (ref 3.5–5.2)
PROT SERPL-MCNC: 5.9 G/DL (ref 6–8.5)
RBC # BLD AUTO: 4.4 10*6/MM3 (ref 3.77–5.28)
SODIUM SERPL-SCNC: 141 MMOL/L (ref 136–145)
WBC NRBC COR # BLD: 6.58 10*3/MM3 (ref 3.4–10.8)

## 2022-12-26 PROCEDURE — 25010000002 REMDESIVIR 100 MG/20ML SOLUTION 1 EACH VIAL: Performed by: FAMILY MEDICINE

## 2022-12-26 PROCEDURE — 85027 COMPLETE CBC AUTOMATED: CPT | Performed by: INTERNAL MEDICINE

## 2022-12-26 PROCEDURE — 25010000002 DEXAMETHASONE PER 1 MG: Performed by: INTERNAL MEDICINE

## 2022-12-26 PROCEDURE — 80053 COMPREHEN METABOLIC PANEL: CPT | Performed by: INTERNAL MEDICINE

## 2022-12-26 PROCEDURE — 99239 HOSP IP/OBS DSCHRG MGMT >30: CPT | Performed by: PHYSICIAN ASSISTANT

## 2022-12-26 RX ORDER — DEXAMETHASONE 6 MG/1
6 TABLET ORAL
Qty: 5 TABLET | Refills: 0 | Status: SHIPPED | OUTPATIENT
Start: 2022-12-26 | End: 2022-12-31

## 2022-12-26 RX ADMIN — REMDESIVIR 100 MG: 100 INJECTION, POWDER, LYOPHILIZED, FOR SOLUTION INTRAVENOUS at 09:35

## 2022-12-26 RX ADMIN — Medication 10 ML: at 09:36

## 2022-12-26 RX ADMIN — DEXAMETHASONE SODIUM PHOSPHATE 6 MG: 4 INJECTION INTRA-ARTICULAR; INTRALESIONAL; INTRAMUSCULAR; INTRAVENOUS; SOFT TISSUE at 09:35

## 2022-12-26 NOTE — CASE MANAGEMENT/SOCIAL WORK
Case Management Discharge Note      Final Note: Plan is home with spouse at discharge. Pt on room air.  No discharge needs identified.         Selected Continued Care - Discharged on 12/26/2022 Admission date: 12/22/2022 - Discharge disposition: Home or Self Care    Destination    No services have been selected for the patient.              Durable Medical Equipment    No services have been selected for the patient.              Dialysis/Infusion    No services have been selected for the patient.              Home Medical Care    No services have been selected for the patient.              Therapy    No services have been selected for the patient.              Community Resources    No services have been selected for the patient.              Community & DME    No services have been selected for the patient.                       Final Discharge Disposition Code: 01 - home or self-care

## 2022-12-26 NOTE — PLAN OF CARE
Goal Outcome Evaluation:     Patient ready for discharge to home after fifth dose of remdesivir.

## 2022-12-26 NOTE — DISCHARGE SUMMARY
[facility]  DISCHARGE SUMMARY    Patient Name: Yarelis Barragan  : 1940  MRN: [id number]    Date of Admission: 2022  7:02 AM  Date of Discharge: 2022  Primary Care Physician: Carlos Shah MD    Hospital Course     Presenting Problem:   COVID-19 [U07.1]    Active Hospital Problems    Diagnosis  POA   • **COVID-19 [U07.1]  Yes   • Cytokine release syndrome, grade 2 [D89.832]  No   • Bronchitis [J40]  Unknown   • Hypoxia [R09.02]  Unknown   • Sore throat [J02.9]  Unknown   • Fatigue [R53.83]  Unknown      Resolved Hospital Problems   No resolved problems to display.      Hospital Course:  Yarelis Barragan is a 82 y.o. female without significant medical history and on no medications. She lives at home with her . On 22, she developed sore throat, dry cough, fatigue and mild dyspnea. She tested positive for COVID-19 on a home test on 22. She to progressive / worsening symptoms, she presented to [facility] ED on  for evaluation. In the ED, her O2 saturations were 90-91% and COVID-19 PCR was positive (negative for Influenza A/B). Labs reassuring with normal WBC count, ESR, CRP, procalcitonin and UA. CTA chest was negative for PE and no ground-glass changes noted, perhaps some bibasilar atelectasis. A CT neck soft tissue was also obtained due to severe sore throat and difficulty tolerating oral intake. She was admitted to the hospital medicine service due to above issues.     She was treated with IV Remdesivir x 5 days.   She will continue Dexamethasone 6mg PO daily x 5 days to complete total 10 days of therapy.   She required 2L NC on admission and has since weaned to room air.     CT soft tissue neck showed a 9mm indeterminate R thyroid nodule. Will outpatient follow up on this.   Pharyngitis has improved significantly and she is tolerating PO intake    At [place], recommend isolation x 20 days from onset of  symptoms. 12/20-1/8/23. First day out of isolation 1/9/23.     Follow up with PCP in 1 week via telemedicine if office participates or ASAP once out of isolation.     Day of Discharge     HPI:   Sitting upright in bed. She is feeling well and has no new complaints. Denies chest pain or dyspnea. Notes mild, infrequent cough. Pharyngitis has improved and she is tolerating PO intake. Feels comfortable going home today.     Review of Systems  Gen- No fevers, chills  CV- No chest pain, palpitations  Resp- No cough, dyspnea  GI- No N/V/D, abd pain    Vital Signs:   Temp:  [97.7 °F (36.5 °C)-98.3 °F (36.8 °C)] 97.7 °F (36.5 °C)  Heart Rate:  [59-94] 59  Resp:  [16-18] 16  BP: (117-146)/(65-75) 126/75    Physical Exam:  Constitutional: No acute distress, awake, alert and conversant. Sitting upright in bed   HENT: NCAT, mucous membranes moist  Respiratory: Clear to auscultation bilaterally, respiratory effort normal on room air   Cardiovascular: RRR, no murmurs, rubs, or gallops  Gastrointestinal: Positive bowel sounds, soft, nontender, nondistended  Musculoskeletal: No bilateral ankle edema  Psychiatric: Appropriate affect, cooperative  Neurologic: Oriented x 3, moves all extremities spontaneously without focal deficits, speech clear  Skin: No rashes    Pertinent  and/or Most Recent Results     LAB RESULTS:      Lab 12/26/22  0326 12/25/22  0517 12/24/22  0901 12/23/22  0430 12/22/22  0807 12/22/22  0747 12/22/22  0732   WBC 6.58 6.60 6.25 6.78  --   --  4.95   HEMOGLOBIN 13.0 12.7 12.6 12.9  --   --  13.5   HEMATOCRIT 40.3 37.9 38.4 39.5  --   --  40.1   PLATELETS 254 235 200 207  --   --  191   NEUTROS ABS  --   --   --   --   --   --  3.08   IMMATURE GRANS (ABS)  --   --   --   --   --   --  0.01   LYMPHS ABS  --   --   --   --   --   --  1.07   MONOS ABS  --   --   --   --   --   --  0.72   EOS ABS  --   --   --   --   --   --  0.05   MCV 91.6 89.6 91.0 91.9  --   --  90.9   SED RATE  --   --   --   --   --   --  14    CRP  --   --   --  1.77* <0.30  --   --    PROCALCITONIN  --   --   --   --   --  0.06  --    LACTATE  --   --   --   --   --   --  0.8         Lab 12/26/22 0326 12/25/22  0517 12/24/22  0901 12/23/22  0430 12/22/22  0747   SODIUM 141 143 139 138 141   POTASSIUM 4.9 4.9 3.6 4.4 4.2   CHLORIDE 110* 110* 107 106 109*   CO2 24.0 26.0 23.0 18.0* 20.0*   ANION GAP 7.0 7.0 9.0 14.0 12.0   BUN 21 21 16 14 14   CREATININE 0.78 0.86 0.75 0.74 0.77   EGFR 76.4 68.0 80.1 81.4 77.6   GLUCOSE 106* 100* 132* 145* 104*   CALCIUM 9.0 9.0 8.2* 8.4* 8.9   MAGNESIUM  --   --   --  2.4 1.8         Lab 12/26/22 0326 12/25/22  0517 12/24/22  0901 12/23/22  0430 12/22/22  0747   TOTAL PROTEIN 5.9* 5.9* 6.0 6.1 6.2   ALBUMIN 3.60 3.80 3.50 4.10 4.00   GLOBULIN 2.3 2.1 2.5 2.0 2.2   ALT (SGPT) 19 16 15 14 15   AST (SGOT) 18 17 18 19 21   BILIRUBIN 0.2 0.3 0.4 0.3 0.4   ALK PHOS 66 61 55 66 70         Lab 12/22/22  0807 12/22/22  0747   PROBNP  --  166.1   TROPONIN T <0.010  --      Brief Urine Lab Results  (Last result in the past 365 days)      Color   Clarity   Blood   Leuk Est   Nitrite   Protein   CREAT   Urine HCG        12/22/22 0733 Yellow   Clear   Negative   Negative   Negative   Negative               Microbiology Results (last 10 days)     Procedure Component Value - Date/Time    Blood Culture - Blood, Arm, Left [[id number]]  (Normal) Collected: 12/22/22 0820    Lab Status: Preliminary result Specimen: Blood from Arm, Left Updated: 12/26/22 0845     Blood Culture No growth at 4 days    Blood Culture - Blood, Arm, Right [[id number]]  (Normal) Collected: 12/22/22 0812    Lab Status: Preliminary result Specimen: Blood from Arm, Right Updated: 12/26/22 0845     Blood Culture No growth at 4 days    COVID PRE-OP / PRE-PROCEDURE SCREENING ORDER (NO ISOLATION) - Swab, Nasopharynx [[id number]]  (Abnormal) Collected: 12/22/22 0714    Lab Status: Final result Specimen: Swab from Nasopharynx Updated: 12/22/22 0759    Narrative:      The  following orders were created for panel order COVID PRE-OP / PRE-PROCEDURE SCREENING ORDER (NO ISOLATION) - Swab, Nasopharynx.  Procedure                               Abnormality         Status                     ---------                               -----------         ------                     COVID-19 and FLU A/B PCR ...[[id number]]  Abnormal            Final result                 Please view results for these tests on the individual orders.    Rapid Strep A Screen - Swab, Throat [[id number]]  (Normal) Collected: 12/22/22 0714    Lab Status: Final result Specimen: Swab from Throat Updated: 12/22/22 0739     Strep A Ag Negative    Narrative:      Test performed by Direct Antigen Testing.    COVID-19 and FLU A/B PCR - Swab, Nasopharynx [[id number]]  (Abnormal) Collected: 12/22/22 0714    Lab Status: Final result Specimen: Swab from Nasopharynx Updated: 12/22/22 0759     COVID19 Detected     Influenza A PCR Not Detected     Influenza B PCR Not Detected    Narrative:      Fact sheet for providers: https://www.fda.gov/media/127836/download    Fact sheet for patients: https://www.fda.gov/media/757818/download    Test performed by PCR.  Influenza A and Influenza B negative results should be considered presumptive in samples that have a positive SARS-CoV-2 result.    Competitive inhibition studies showed that SARS-CoV-2 virus, when present at concentrations above 3.6E+04 copies/mL, can inhibit the detection and amplification of influenza A and influenza B virus RNA if present at or below 1.8E+02 copies/mL or 4.9E+02 copies/mL, respectively, and may lead to false negative influenza virus results. If co-infection with influenza A or influenza B virus is suspected in samples with a positive SARS-CoV-2 result, the sample should be re-tested with another FDA cleared, approved, or authorized influenza test, if influenza virus detection would change clinical management.    Beta Strep Culture, Throat - Swab, Throat [[id number]]   (Normal) Collected: 12/22/22 0714    Lab Status: Final result Specimen: Swab from Throat Updated: 12/24/22 0747     Throat Culture, Beta Strep No Beta Hemolytic Streptococcus Isolated        CT Head Without Contrast    Result Date: 12/22/2022  CT HEAD WO CONTRAST-  Date of Exam: 12/22/2022 2:03 PM  Indication: abnormal findings on CT today; subdural hematoma.  Comparison: 12/22/2022 CT soft tissue neck earlier same day  Technique:  Without contrast, contiguous axial CT images of the head were obtained from skull base to vertex.  Coronal and sagittal reconstructions were performed.  Automated exposure control and iterative reconstruction methods were used.  FINDINGS PET/CT demonstrate symmetric widening of the anterior CSF spaces bilaterally. This is likely due to atrophy rather than a chronic subdural hematoma or hygroma. The previous study of the soft tissue neck was performed with the head somewhat angled in the gantry, which cause this to look asymmetric. There is mild periventricular white matter hypodensity most commonly secondary to chronic small vessel ischemic change. Medial basal ganglia calcifications are noted. There is no evidence of mass effect, midline shift. Evaluation for hemorrhage is limited due to persistent contrast opacification within the vasculature from earlier prior study. No pathologic parenchymal enhancement is identified. There is normal gray-white differentiation. Atherosclerotic vascular calcification is noted. Orbits are unremarkable for age. Visualized paranasal sinuses and mastoid air cells are clear. No acute calvarial defects are seen.       1. Frontal parenchymal volume loss resulting in widening of the anterior CSF spaces bilaterally. Earlier soft tissue neck CT was performed with the head angled in the gantry which gave an asymmetric appearance to the anterior CSF spaces. No evidence of subdural hematoma or hygroma on the current study. 2. Parenchymal volume loss and probable  chronic small vessel ischemic change. Brain MRI is more sensitive to evaluate for acute or subacute infarcts and to evaluate for intracranial metastatic disease.  This report was finalized on 12/22/2022 2:22 PM by Osiel Carson MD.      CT Soft Tissue Neck With Contrast    Result Date: 12/22/2022  CT SOFT TISSUE NECK W CONTRAST-  Date of Exam: 12/22/2022 10:58 AM  Indication: foreign body sensation in painful throat  Comparison: None available.  Technique: Contiguous axial imaging of the neck was performed after uneventful administration of 100 cc Isovue-370 .  Coronal and sagittal reconstructions were performed.  Automated exposure control and iterative reconstruction methods were used.  FINDINGS: There is a small fluid density extra-axial collection at the left frontal region measuring 7 mm. This is suggestive of a chronic subdural hematoma or hygroma. Visualized basilar portions of brain and skull base are otherwise grossly unremarkable. There is no pathologic enhancement. Visualized paranasal sinuses and mastoid air cells are clear. Visualized orbits are unremarkable for age. Parotid and submandibular glands appears symmetric. There is a 9 mm indeterminate hypodense lesion in the inferior right thyroid lobe. No cervical adenopathy is identified. Parapharyngeal soft tissues are unremarkable. The epiglottis and periepiglottic tissues appear symmetric and unremarkable. No abnormal fluid collections or inflammatory changes.  Atherosclerotic vascular calcification is present. There is degenerative change in cervical spine. No aggressive osseous lesions are identified.       1. No acute soft tissue abnormality identified in the neck. 2. 7 mm fluid density extra-axial collection partially included over the left frontal region suggesting a chronic subdural hematoma or hygroma. 3. 9 mm indeterminate right thyroid nodule.    This report was finalized on 12/22/2022 12:59 PM by Osiel Carson MD.      CT Angiogram  Chest    Result Date: 12/22/2022  CT ANGIOGRAM CHEST-  Date of Exam: 12/22/2022 10:58 AM  Indication: dyspnea; covid.  Comparison: None available.  Technique: Contiguous axial CTA imaging of the chest was obtained following the uneventful intravenous administration of 100 cc Isovue-370 contrast. Reconstructions in the coronal and sagittal planes were also performed. 3-D reconstructed images were also created and reviewed. Automated exposure control and iterative reconstruction methods were used.  FINDINGS: There are no pulmonary arterial filling defects evident to suggest PE. Pulmonary arteries appear grossly normal caliber. Thoracic aorta is normal caliber. There is mild scarring in the lung apices. There is patchy groundglass opacity in the lung bases bilaterally which may be due to atypical infiltrate or dependent atelectasis. No pleural fluid is seen. No pulmonary nodules or masses are identified. Central airways are grossly patent. The stomach or hilar adenopathy is identified. No axillary or supraclavicular adenopathy is identified. Limited imaging through the upper abdomen demonstrates a grossly normal adrenal morphology. There is a small hiatal hernia. Small amount of pericardial fluid is noted. There are mild degenerative changes in the spine. No aggressive osseous lesions are identified.       1. No evidence of pulmonary embolus. 2. Nonspecific groundglass opacities in the bases may be due to early atypical infiltrate or dependent atelectasis.  This report was finalized on 12/22/2022 12:45 PM by Osiel Carson MD.      Discharge Details        Discharge Medications      New Medications      Instructions Start Date   dexamethasone 6 MG tablet  Commonly known as: DECADRON   6 mg, Oral, Daily With Breakfast         Stop These Medications    HYDROcodone-acetaminophen 5-325 MG per tablet  Commonly known as: NORCO          No Known Allergies    Discharge Disposition:  Home or Self Care    Diet:  Diet Instructions      Diet: Regular; Thin      Discharge Diet: Regular    Fluid Consistency: Thin        Activity:  Activity Instructions     Activity as Tolerated      Up WIth Assist          CODE STATUS:    Code Status and Medical Interventions:   Ordered at: 12/22/22 5016     Medical Intervention Limits:    NO intubation (DNI)     Code Status (Patient has no pulse and is not breathing):    No CPR (Do Not Attempt to Resuscitate)     Medical Interventions (Patient has pulse or is breathing):    Limited Support     No future appointments.    Additional Instructions for the Follow-ups that You Need to Schedule     Discharge Follow-up with PCP   As directed       Currently Documented PCP:    Carlos Shah MD    PCP Phone Number:    [phone]     Follow Up Details: Telemedicine in 1 week or after 1/9/23 (when out of isolation)             Soraida Carey PA-C  12/26/22    Time Spent on Discharge:  I spent 35 minutes on this discharge activity which included: face-to-face encounter with the patient, reviewing the data in the system, coordination of the care with the nursing staff as well as consultants, documentation, and entering orders.

## 2022-12-26 NOTE — PLAN OF CARE
Goal Outcome Evaluation:      VSS throughout shift, on room air. Patient alert and oriented. Plans to D/C today after final dose of Remdesivir.

## 2022-12-27 LAB
BACTERIA SPEC AEROBE CULT: NORMAL
BACTERIA SPEC AEROBE CULT: NORMAL

## 2022-12-27 NOTE — OUTREACH NOTE
Prep Survey    Flowsheet Row Responses   [facility] facility patient discharged from? [facility]   Is LACE score < 7 ? No   Eligibility Readm Mgmt   Discharge diagnosis hypoxic, COVID-19, bronchitis   Does the patient have one of the following disease processes/diagnoses(primary or secondary)? Other   Does the patient have Home health ordered? No   Is there a DME ordered? No   Prep survey completed? Yes          ERIC BEAN - Registered Nurse

## 2022-12-28 ENCOUNTER — READMISSION MANAGEMENT (OUTPATIENT)
Dept: CALL CENTER | Facility: HOSPITAL | Age: 82
End: 2022-12-28

## 2022-12-28 NOTE — OUTREACH NOTE
Medical Week 1 Survey    Flowsheet Row Responses   [facility] patient discharged from? [facility]   Does the patient have one of the following disease processes/diagnoses(primary or secondary)? Other   Week 1 attempt successful? Yes   Call start time 1540   Call end time 1544   Discharge diagnosis hypoxic, COVID-19, bronchitis   Meds reviewed with patient/caregiver? Yes   Is the patient having any side effects they believe may be caused by any medication additions or changes? No   Does the patient have all medications ordered at discharge? Yes   Is the patient taking all medications as directed (includes completed medication regime)? Yes   Does the patient have a primary care provider?  Yes   Does the patient have an appointment with their PCP within 7 days of discharge? Yes   Comments regarding PCP 1/4/22 at 4:30 PM    Has the patient kept scheduled appointments due by today? N/A   Psychosocial issues? No   Did the patient receive a copy of their discharge instructions? Yes   Nursing interventions Reviewed instructions with patient   What is the patient's perception of their health status since discharge? Improving  [weak]   Is the patient/caregiver able to teach back signs and symptoms related to disease process for when to call PCP? Yes   Is the patient/caregiver able to teach back signs and symptoms related to disease process for when to call 911? Yes   Is the patient/caregiver able to teach back the hierarchy of who to call/visit for symptoms/problems? PCP, Specialist, Home health nurse, Urgent Care, ED, 911 Yes   If the patient is a current smoker, are they able to teach back resources for cessation? Not a smoker   Week 1 call completed? Yes   Is the patient interested in additional calls from an ambulatory ?  NOTE:  applies to high risk patients requiring additional follow-up. No   Revoked No further contact(revokes)-requires comment          DANIELA RENTERIA - Registered Nurse

## 2023-09-19 ENCOUNTER — TRANSCRIBE ORDERS (OUTPATIENT)
Dept: GENERAL RADIOLOGY | Facility: HOSPITAL | Age: 83
End: 2023-09-19
Payer: MEDICARE

## 2023-09-19 ENCOUNTER — HOSPITAL ENCOUNTER (OUTPATIENT)
Dept: GENERAL RADIOLOGY | Facility: HOSPITAL | Age: 83
Discharge: HOME | End: 2023-09-19
Admitting: FAMILY MEDICINE
Payer: MEDICARE

## 2023-09-19 DIAGNOSIS — R06.02 SHORTNESS OF BREATH: ICD-10-CM

## 2023-09-19 DIAGNOSIS — R06.02 SHORTNESS OF BREATH: Primary | ICD-10-CM

## 2023-09-19 PROCEDURE — 71046 X-RAY EXAM CHEST 2 VIEWS: CPT

## 2023-09-20 ENCOUNTER — TRANSCRIBE ORDERS (OUTPATIENT)
Dept: ADMINISTRATIVE | Facility: HOSPITAL | Age: 83
End: 2023-09-20
Payer: MEDICARE

## 2023-09-20 DIAGNOSIS — E04.1 RIGHT THYROID NODULE: Primary | ICD-10-CM

## 2025-03-25 ENCOUNTER — TRANSCRIBE ORDERS (OUTPATIENT)
Dept: LAB | Facility: HOSPITAL | Age: 85
End: 2025-03-25
Payer: MEDICARE

## 2025-03-25 ENCOUNTER — LAB (OUTPATIENT)
Dept: LAB | Facility: HOSPITAL | Age: 85
End: 2025-03-25
Payer: MEDICARE

## 2025-03-25 DIAGNOSIS — R32 URINARY INCONTINENCE, UNSPECIFIED TYPE: Primary | ICD-10-CM

## 2025-03-25 DIAGNOSIS — R32 URINARY INCONTINENCE, UNSPECIFIED TYPE: ICD-10-CM

## 2025-03-25 LAB
ANION GAP SERPL CALCULATED.3IONS-SCNC: 10.5 MMOL/L (ref 5–15)
BACTERIA UR QL AUTO: NORMAL /HPF
BILIRUB UR QL STRIP: NEGATIVE
BUN SERPL-MCNC: 11 MG/DL (ref 8–23)
BUN/CREAT SERPL: 13.6 (ref 7–25)
CA-I SERPL ISE-MCNC: 1.29 MMOL/L (ref 1.15–1.35)
CALCIUM SPEC-SCNC: 9 MG/DL (ref 8.6–10.5)
CHLORIDE SERPL-SCNC: 105 MMOL/L (ref 98–107)
CLARITY UR: CLEAR
CO2 SERPL-SCNC: 23.5 MMOL/L (ref 22–29)
COLOR UR: YELLOW
CREAT SERPL-MCNC: 0.81 MG/DL (ref 0.57–1)
EGFRCR SERPLBLD CKD-EPI 2021: 72.1 ML/MIN/1.73
GLUCOSE SERPL-MCNC: 80 MG/DL (ref 65–99)
GLUCOSE UR STRIP-MCNC: NEGATIVE MG/DL
HGB UR QL STRIP.AUTO: NEGATIVE
HYALINE CASTS UR QL AUTO: NORMAL /LPF
KETONES UR QL STRIP: NEGATIVE
LEUKOCYTE ESTERASE UR QL STRIP.AUTO: ABNORMAL
NITRITE UR QL STRIP: NEGATIVE
PH UR STRIP.AUTO: 6.5 [PH] (ref 5–8)
POTASSIUM SERPL-SCNC: 4.2 MMOL/L (ref 3.5–5.2)
PROT UR QL STRIP: NEGATIVE
RBC # UR STRIP: NORMAL /HPF
REF LAB TEST METHOD: NORMAL
SODIUM SERPL-SCNC: 139 MMOL/L (ref 136–145)
SP GR UR STRIP: 1.01 (ref 1–1.03)
SQUAMOUS #/AREA URNS HPF: NORMAL /HPF
UROBILINOGEN UR QL STRIP: ABNORMAL
WBC # UR STRIP: NORMAL /HPF

## 2025-03-25 PROCEDURE — 36415 COLL VENOUS BLD VENIPUNCTURE: CPT

## 2025-03-25 PROCEDURE — 82330 ASSAY OF CALCIUM: CPT

## 2025-03-25 PROCEDURE — 81001 URINALYSIS AUTO W/SCOPE: CPT

## 2025-03-25 PROCEDURE — 80048 BASIC METABOLIC PNL TOTAL CA: CPT
